# Patient Record
Sex: MALE | Race: WHITE | NOT HISPANIC OR LATINO | Employment: FULL TIME | URBAN - METROPOLITAN AREA
[De-identification: names, ages, dates, MRNs, and addresses within clinical notes are randomized per-mention and may not be internally consistent; named-entity substitution may affect disease eponyms.]

---

## 2024-10-24 ENCOUNTER — OFFICE VISIT (OUTPATIENT)
Dept: PRIMARY CARE | Facility: CLINIC | Age: 49
End: 2024-10-24
Payer: COMMERCIAL

## 2024-10-24 VITALS
HEIGHT: 70 IN | DIASTOLIC BLOOD PRESSURE: 64 MMHG | OXYGEN SATURATION: 98 % | SYSTOLIC BLOOD PRESSURE: 138 MMHG | HEART RATE: 76 BPM | TEMPERATURE: 97.9 F | WEIGHT: 247.6 LBS | BODY MASS INDEX: 35.45 KG/M2 | RESPIRATION RATE: 18 BRPM

## 2024-10-24 DIAGNOSIS — E11.9 TYPE 2 DIABETES MELLITUS WITHOUT COMPLICATION, WITHOUT LONG-TERM CURRENT USE OF INSULIN (MULTI): Primary | ICD-10-CM

## 2024-10-24 DIAGNOSIS — E11.9 TYPE 2 DIABETES MELLITUS WITHOUT COMPLICATION, WITHOUT LONG-TERM CURRENT USE OF INSULIN (MULTI): ICD-10-CM

## 2024-10-24 DIAGNOSIS — I10 ESSENTIAL HYPERTENSION: ICD-10-CM

## 2024-10-24 DIAGNOSIS — Z87.738 HISTORY OF IMPERFORATE ANUS: ICD-10-CM

## 2024-10-24 DIAGNOSIS — E66.812 OBESITY, CLASS II, BMI 35-39.9: ICD-10-CM

## 2024-10-24 DIAGNOSIS — N52.9 ERECTILE DYSFUNCTION, UNSPECIFIED ERECTILE DYSFUNCTION TYPE: ICD-10-CM

## 2024-10-24 DIAGNOSIS — E11.8 CONTROLLED DIABETES MELLITUS TYPE 2 WITH COMPLICATIONS, UNSPECIFIED WHETHER LONG TERM INSULIN USE (MULTI): ICD-10-CM

## 2024-10-24 DIAGNOSIS — R68.82 DIMINISHED LIBIDO: ICD-10-CM

## 2024-10-24 DIAGNOSIS — Z71.89: ICD-10-CM

## 2024-10-24 DIAGNOSIS — Z00.00 HEALTH CARE MAINTENANCE: ICD-10-CM

## 2024-10-24 PROBLEM — J96.01 ACUTE RESPIRATORY FAILURE WITH HYPOXIA (MULTI): Status: RESOLVED | Noted: 2020-07-06 | Resolved: 2024-10-24

## 2024-10-24 PROBLEM — J96.01 ACUTE RESPIRATORY FAILURE WITH HYPOXIA (MULTI): Status: ACTIVE | Noted: 2020-07-06

## 2024-10-24 LAB — POC HEMOGLOBIN A1C: 12.5 % (ref 4.2–6.5)

## 2024-10-24 PROCEDURE — 83036 HEMOGLOBIN GLYCOSYLATED A1C: CPT

## 2024-10-24 PROCEDURE — 3078F DIAST BP <80 MM HG: CPT

## 2024-10-24 PROCEDURE — 3075F SYST BP GE 130 - 139MM HG: CPT

## 2024-10-24 PROCEDURE — 3008F BODY MASS INDEX DOCD: CPT

## 2024-10-24 PROCEDURE — 1036F TOBACCO NON-USER: CPT

## 2024-10-24 PROCEDURE — 99204 OFFICE O/P NEW MOD 45 MIN: CPT

## 2024-10-24 RX ORDER — METFORMIN HYDROCHLORIDE 500 MG/1
TABLET ORAL
Qty: 120 TABLET | Refills: 1 | Status: SHIPPED | OUTPATIENT
Start: 2024-10-24 | End: 2024-10-25

## 2024-10-24 RX ORDER — TIRZEPATIDE 2.5 MG/.5ML
2.5 INJECTION, SOLUTION SUBCUTANEOUS WEEKLY
Qty: 4 PEN | Refills: 0 | Status: SHIPPED | OUTPATIENT
Start: 2024-10-24

## 2024-10-24 NOTE — PATIENT INSTRUCTIONS
Buy BP cuff, check 3 times a week or so, write them down, follow up with me in a month if they are consistently > 120s/80s

## 2024-10-24 NOTE — PROGRESS NOTES
"Subjective   Endy Pruett is a 49 y.o. male   Patient was diagnosed with type 2 diabetes during hospitalization in 2020.  At that time he was hospitalized for COVID-19.  He was on metformin and Amaryl for a time.  He was then on insulin Lantus 25 units nightly.  He has been off of everything for a couple years.  He also has a history of hypertension.  He was originally on lisinopril but he was allergic to that.  He was then on losartan but he has been off of that as well.  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  He reports that his sex drive has significantly dropped recently.  Also his wife is going through menopause.  Patient is unable to get and keep an erection.  He did an online clinic that prescribed him Viagra 25 mg and it did not help.  He is a  and a .    Objective   /64   Pulse 76   Temp 36.6 °C (97.9 °F)   Resp 18   Ht 1.778 m (5' 10\")   Wt 112 kg (247 lb 9.6 oz)   SpO2 98%   BMI 35.53 kg/m²    PHYSICAL EXAM  Gen: Well appearing, in NAD  Eyes: EOMI  HEENT: MMM  Heart: RRR, no murmurs  Lungs: No increased work of breathing, CTAB, on RA  Extremities: WWP, cap refill <2sec, no pitting edema in LE b/l  Neuro: Alert, symmetrical facies, moves all extremities equally  Psych: Appropriate mood and affect    Assessment/Plan     Follow-up in 1 month for annual physical    Problem List Items Addressed This Visit       Type 2 diabetes mellitus without complication, without long-term current use of insulin (Multi) - Primary    Current Assessment & Plan     In office A1c today is extremely elevated at 12.5%.  He has been off of all diabetic medication for years.  Will start him on metformin 500 mg and uptitrate him weekly to a goal of 1000 mg twice daily.  I am also prescribing Mounjaro for type 2 diabetes and obesity.  Also giving a referral to my pharmacy colleagues to " help with diabetic management and GLP-1 medication coverage.  Eventually he will need to get on a high intensity statin.  Will also have to consider putting him back on a low-dose ARB. He is overdue for annual diabetic eye and foot exam.  He is not interested in dietitian or diabetic educator referrals.  He is a  and is pretty familiar with nutrition.  Counseled again on low carbohydrate diet.  Follow-up in 1 month.  Repeat A1c in 3 months.         Relevant Medications    metFORMIN (Glucophage) 500 mg tablet    tirzepatide (Mounjaro) 2.5 mg/0.5 mL pen injector    Other Relevant Orders    Referral to Clinical Pharmacy    Obesity, Class II, BMI 35-39.9    Current Assessment & Plan     Counseled on healthy diet and regular exercise.  Also I am trying to get Mounjaro covered to treat patient's uncontrolled type 2 diabetes and obesity.         Essential hypertension    Current Assessment & Plan     Patient has been off of all antihypertensive medications for a while now.  Blood pressure was only mildly elevated in the office today at 138/64.  Recommended the patient start checking blood pressures at home and follow-up in a month.  If they are consistently greater than 130s over 90s we will start him on low-dose losartan again.  He will likely need to be on low-dose ARB anyway in relation to his type 2 diabetes.  He has an allergy to lisinopril.         Erectile dysfunction    Current Assessment & Plan     Discussed that patient's decreased libido and erectile dysfunction could be multifactorial.  As far as the physiological erectile dysfunction, I counseled patient that uncontrolled diabetes can play a role.  I am not sure if this would also contribute to the diminished libido.  As part of his annual blood work I will also check testosterone levels.  He has failed very low-dose Viagra in the past.  If he wanted to try medication again we could go up in dose or try Cialis or refer to urology.  Discussed with patient  that treating his diabetes should also help.  He does not want to restart any medications for erectile dysfunction today.         Relevant Orders    Testosterone, total and free    History of imperforate anus    Overview     Patient had surgery as a child and he now has a surgically made rectum         Diminished libido     Other Visit Diagnoses       Advised to contact pharmacist        Health care maintenance        Relevant Orders    CBC    Comprehensive Metabolic Panel    Lipid Panel    TSH with reflex to Free T4 if abnormal    Vitamin D 25-Hydroxy,Total (for eval of Vitamin D levels)    Vitamin B12    Prostate Specific Antigen          Mile Hurst D.O.  Family Medicine Physician  [facility]  [address] [place] [address] [phone]    This note has been transcribed using Dragon voice recognition system and there is a possibility of unintentional typing misprints.

## 2024-10-24 NOTE — ASSESSMENT & PLAN NOTE
Counseled on healthy diet and regular exercise.  Also I am trying to get Mounjaro covered to treat patient's uncontrolled type 2 diabetes and obesity.

## 2024-10-24 NOTE — ASSESSMENT & PLAN NOTE
In office A1c today is extremely elevated at 12.5%.  He has been off of all diabetic medication for years.  Will start him on metformin 500 mg and uptitrate him weekly to a goal of 1000 mg twice daily.  I am also prescribing Mounjaro for type 2 diabetes and obesity.  Also giving a referral to my pharmacy colleagues to help with diabetic management and GLP-1 medication coverage.  Eventually he will need to get on a high intensity statin.  Will also have to consider putting him back on a low-dose ARB. He is overdue for annual diabetic eye and foot exam.  He is not interested in dietitian or diabetic educator referrals.  He is a  and is pretty familiar with nutrition.  Counseled again on low carbohydrate diet.  Follow-up in 1 month.  Repeat A1c in 3 months.

## 2024-10-24 NOTE — ASSESSMENT & PLAN NOTE
Discussed that patient's decreased libido and erectile dysfunction could be multifactorial.  As far as the physiological erectile dysfunction, I counseled patient that uncontrolled diabetes can play a role.  I am not sure if this would also contribute to the diminished libido.  As part of his annual blood work I will also check testosterone levels.  He has failed very low-dose Viagra in the past.  If he wanted to try medication again we could go up in dose or try Cialis or refer to urology.  Discussed with patient that treating his diabetes should also help.  He does not want to restart any medications for erectile dysfunction today.

## 2024-10-24 NOTE — ASSESSMENT & PLAN NOTE
Patient has been off of all antihypertensive medications for a while now.  Blood pressure was only mildly elevated in the office today at 138/64.  Recommended the patient start checking blood pressures at home and follow-up in a month.  If they are consistently greater than 130s over 90s we will start him on low-dose losartan again.  He will likely need to be on low-dose ARB anyway in relation to his type 2 diabetes.  He has an allergy to lisinopril.

## 2024-10-25 RX ORDER — METFORMIN HYDROCHLORIDE 500 MG/1
TABLET ORAL
Qty: 360 TABLET | Refills: 3 | Status: SHIPPED | OUTPATIENT
Start: 2024-10-25

## 2024-10-28 ENCOUNTER — TELEPHONE (OUTPATIENT)
Dept: PRIMARY CARE | Facility: CLINIC | Age: 49
End: 2024-10-28
Payer: COMMERCIAL

## 2024-11-12 ENCOUNTER — LAB (OUTPATIENT)
Dept: LAB | Facility: LAB | Age: 49
End: 2024-11-12
Payer: COMMERCIAL

## 2024-11-12 DIAGNOSIS — N52.9 ERECTILE DYSFUNCTION, UNSPECIFIED ERECTILE DYSFUNCTION TYPE: ICD-10-CM

## 2024-11-12 DIAGNOSIS — Z00.00 HEALTH CARE MAINTENANCE: ICD-10-CM

## 2024-11-12 LAB
25(OH)D3 SERPL-MCNC: 20 NG/ML (ref 30–100)
ALBUMIN SERPL BCP-MCNC: 4.1 G/DL (ref 3.4–5)
ALP SERPL-CCNC: 48 U/L (ref 33–120)
ALT SERPL W P-5'-P-CCNC: 20 U/L (ref 10–52)
ANION GAP SERPL CALC-SCNC: 8 MMOL/L (ref 10–20)
AST SERPL W P-5'-P-CCNC: 20 U/L (ref 9–39)
BILIRUB SERPL-MCNC: 1.3 MG/DL (ref 0–1.2)
BUN SERPL-MCNC: 9 MG/DL (ref 6–23)
CALCIUM SERPL-MCNC: 8.6 MG/DL (ref 8.6–10.3)
CHLORIDE SERPL-SCNC: 103 MMOL/L (ref 98–107)
CHOLEST SERPL-MCNC: 117 MG/DL (ref 0–199)
CHOLESTEROL/HDL RATIO: 2.4
CO2 SERPL-SCNC: 32 MMOL/L (ref 21–32)
CREAT SERPL-MCNC: 0.82 MG/DL (ref 0.5–1.3)
EGFRCR SERPLBLD CKD-EPI 2021: >90 ML/MIN/1.73M*2
ERYTHROCYTE [DISTWIDTH] IN BLOOD BY AUTOMATED COUNT: 12.6 % (ref 11.5–14.5)
GLUCOSE SERPL-MCNC: 177 MG/DL (ref 74–99)
HCT VFR BLD AUTO: 46.2 % (ref 41–52)
HDLC SERPL-MCNC: 49.4 MG/DL
HGB BLD-MCNC: 15.5 G/DL (ref 13.5–17.5)
LDLC SERPL CALC-MCNC: 50 MG/DL
MCH RBC QN AUTO: 30.3 PG (ref 26–34)
MCHC RBC AUTO-ENTMCNC: 33.5 G/DL (ref 32–36)
MCV RBC AUTO: 90 FL (ref 80–100)
NON HDL CHOLESTEROL: 68 MG/DL (ref 0–149)
NRBC BLD-RTO: 0 /100 WBCS (ref 0–0)
PLATELET # BLD AUTO: 170 X10*3/UL (ref 150–450)
POTASSIUM SERPL-SCNC: 4.4 MMOL/L (ref 3.5–5.3)
PROT SERPL-MCNC: 6.5 G/DL (ref 6.4–8.2)
PSA SERPL-MCNC: 0.3 NG/ML
RBC # BLD AUTO: 5.12 X10*6/UL (ref 4.5–5.9)
SODIUM SERPL-SCNC: 139 MMOL/L (ref 136–145)
TRIGL SERPL-MCNC: 87 MG/DL (ref 0–149)
TSH SERPL-ACNC: 1.01 MIU/L (ref 0.44–3.98)
VIT B12 SERPL-MCNC: 364 PG/ML (ref 211–911)
VLDL: 17 MG/DL (ref 0–40)
WBC # BLD AUTO: 5 X10*3/UL (ref 4.4–11.3)

## 2024-11-12 PROCEDURE — 82306 VITAMIN D 25 HYDROXY: CPT

## 2024-11-12 PROCEDURE — 85027 COMPLETE CBC AUTOMATED: CPT

## 2024-11-12 PROCEDURE — 84402 ASSAY OF FREE TESTOSTERONE: CPT

## 2024-11-12 PROCEDURE — 80061 LIPID PANEL: CPT

## 2024-11-12 PROCEDURE — 84153 ASSAY OF PSA TOTAL: CPT

## 2024-11-12 PROCEDURE — 36415 COLL VENOUS BLD VENIPUNCTURE: CPT

## 2024-11-12 PROCEDURE — 80053 COMPREHEN METABOLIC PANEL: CPT

## 2024-11-12 PROCEDURE — 82607 VITAMIN B-12: CPT

## 2024-11-12 PROCEDURE — 84443 ASSAY THYROID STIM HORMONE: CPT

## 2024-11-16 LAB
TESTOSTERONE FREE (CHAN): 70.6 PG/ML (ref 35–155)
TESTOSTERONE,TOTAL,LC-MS/MS: 494 NG/DL (ref 250–1100)

## 2024-11-25 ENCOUNTER — APPOINTMENT (OUTPATIENT)
Dept: PRIMARY CARE | Facility: CLINIC | Age: 49
End: 2024-11-25
Payer: COMMERCIAL

## 2024-11-25 VITALS
TEMPERATURE: 97.2 F | WEIGHT: 247.4 LBS | BODY MASS INDEX: 35.42 KG/M2 | HEART RATE: 80 BPM | RESPIRATION RATE: 18 BRPM | DIASTOLIC BLOOD PRESSURE: 60 MMHG | OXYGEN SATURATION: 96 % | HEIGHT: 70 IN | SYSTOLIC BLOOD PRESSURE: 128 MMHG

## 2024-11-25 DIAGNOSIS — E80.6 HYPERBILIRUBINEMIA: ICD-10-CM

## 2024-11-25 DIAGNOSIS — Z87.738 HISTORY OF IMPERFORATE ANUS: ICD-10-CM

## 2024-11-25 DIAGNOSIS — Z12.11 ENCOUNTER FOR SCREENING FOR MALIGNANT NEOPLASM OF COLON: ICD-10-CM

## 2024-11-25 DIAGNOSIS — Z00.00 HEALTH CARE MAINTENANCE: ICD-10-CM

## 2024-11-25 DIAGNOSIS — E55.9 VITAMIN D DEFICIENCY: ICD-10-CM

## 2024-11-25 DIAGNOSIS — E66.812 OBESITY, CLASS II, BMI 35-39.9: ICD-10-CM

## 2024-11-25 DIAGNOSIS — Z23 ENCOUNTER FOR IMMUNIZATION: ICD-10-CM

## 2024-11-25 DIAGNOSIS — Z00.00 ANNUAL PHYSICAL EXAM: Primary | ICD-10-CM

## 2024-11-25 DIAGNOSIS — N52.1 ERECTILE DYSFUNCTION DUE TO DISEASES CLASSIFIED ELSEWHERE: ICD-10-CM

## 2024-11-25 DIAGNOSIS — H91.93 BILATERAL HEARING LOSS, UNSPECIFIED HEARING LOSS TYPE: ICD-10-CM

## 2024-11-25 DIAGNOSIS — E11.9 TYPE 2 DIABETES MELLITUS WITHOUT COMPLICATION, WITHOUT LONG-TERM CURRENT USE OF INSULIN (MULTI): ICD-10-CM

## 2024-11-25 PROBLEM — I10 ESSENTIAL HYPERTENSION: Status: RESOLVED | Noted: 2017-10-26 | Resolved: 2024-11-25

## 2024-11-25 PROCEDURE — 3048F LDL-C <100 MG/DL: CPT

## 2024-11-25 PROCEDURE — 1036F TOBACCO NON-USER: CPT

## 2024-11-25 PROCEDURE — 3078F DIAST BP <80 MM HG: CPT

## 2024-11-25 PROCEDURE — 99396 PREV VISIT EST AGE 40-64: CPT

## 2024-11-25 PROCEDURE — 3074F SYST BP LT 130 MM HG: CPT

## 2024-11-25 PROCEDURE — 90471 IMMUNIZATION ADMIN: CPT

## 2024-11-25 PROCEDURE — 3008F BODY MASS INDEX DOCD: CPT

## 2024-11-25 PROCEDURE — 90656 IIV3 VACC NO PRSV 0.5 ML IM: CPT

## 2024-11-25 RX ORDER — LANCETS
1 EACH MISCELLANEOUS DAILY
Qty: 100 EACH | Refills: 3 | Status: SHIPPED | OUTPATIENT
Start: 2024-11-25

## 2024-11-25 RX ORDER — ROSUVASTATIN CALCIUM 20 MG/1
20 TABLET, COATED ORAL DAILY
Qty: 90 TABLET | Refills: 3 | Status: SHIPPED | OUTPATIENT
Start: 2024-11-25 | End: 2025-11-20

## 2024-11-25 RX ORDER — BISMUTH SUBSALICYLATE 262 MG
1 TABLET,CHEWABLE ORAL DAILY
COMMUNITY

## 2024-11-25 RX ORDER — CHOLECALCIFEROL (VITAMIN D3) 50 MCG
50 TABLET ORAL DAILY
COMMUNITY

## 2024-11-25 RX ORDER — TIRZEPATIDE 5 MG/.5ML
5 INJECTION, SOLUTION SUBCUTANEOUS WEEKLY
Qty: 2 ML | Refills: 2 | Status: SHIPPED | OUTPATIENT
Start: 2024-11-25 | End: 2024-11-27 | Stop reason: SDUPTHER

## 2024-11-25 RX ORDER — TIRZEPATIDE 2.5 MG/.5ML
2.5 INJECTION, SOLUTION SUBCUTANEOUS WEEKLY
Qty: 4 PEN | Refills: 0 | Status: CANCELLED | OUTPATIENT
Start: 2024-11-25

## 2024-11-25 RX ORDER — INSULIN PUMP SYRINGE, 3 ML
1 EACH MISCELLANEOUS 3 TIMES DAILY PRN
Qty: 1 KIT | Refills: 0 | Status: SHIPPED | OUTPATIENT
Start: 2024-11-25 | End: 2024-11-26

## 2024-11-25 NOTE — ASSESSMENT & PLAN NOTE
Patient is currently drinking too much alcohol so I counseled him on decreasing his use.  In a couple of months we will recheck his bilirubin level, as well as his A1c, and his vitamin D level.

## 2024-11-25 NOTE — PATIENT INSTRUCTIONS
Increase to mounjaro 5mg after finishing the 2.5mg injections  Schedule once monthly phone calls with clinical pharmacist after being on mounjaro 5mg for about 1 month  We will repeat an A1c and liver tests and vit D at the end of January (I ordered these) - do not need to be fasting   Come back and see me at the end of February  Decrease alcohol use  Start crestor (rosuvastatin)  Get colonoscopy done  See a dentist  See audiologist for hearing test  Check blood sugars and blood pressures occasionally at home and write them down

## 2024-11-25 NOTE — ASSESSMENT & PLAN NOTE
A1c from 10/24/2024 was extremely elevated at 12.5%.  That was when he was off of all medications for years.  Continue metformin 1000 mg twice daily.  Will increase Mounjaro from 2.5 to 5 mg once weekly.  Recommended he begin his clinical pharmacy monthly phone calls once he has been on the Mounjaro 5 mg for 1 month.  Will start patient on rosuvastatin 20 mg daily as his high intensity statin.  He is still currently not on an ACE inhibitor or ARB.  He has an intolerance to lisinopril.  His blood pressures are great in the office today at 128/60 on no anti-hypertensives so we will hold off on starting an ARB yet today.  May reconsider in the future.  Updated annual diabetic foot exam in the office today.  It was normal.  Patient is up-to-date on annual diabetic eye exam.  He has not been checking his blood sugars at home recently.  I am sending him in more diabetic testing supplies.  He is up-to-date on vaccinations.  Will repeat an A1c in a couple of months when it has been 3 months since his last.  Follow-up in 3 months.

## 2024-11-25 NOTE — PROGRESS NOTES
Subjective   Endy Pruett is a 49 y.o. male     ANNUAL PHYSICAL EXAM    Concerns:  - At his last visit patient was not on any medication for his diabetes and it was extremely uncontrolled so I started him on metformin 500 mg once daily and he has uptitrated it slowly, and is now on metformin 1000 mg twice daily.  He also has been on Mounjaro 2.5 mg once weekly.  He states that the only side effects he has had from either of these was a little bit of diarrhea with the metformin but usually he has no issues.  He is not yet on a statin medication.  He used to be on lisinopril for hypertension however he is allergic to that.  He has been checking blood pressures at home and a couple of them have been elevated however in the office today it is normal at 128/60.  He is up-to-date on annual eye exam.  He needs an updated diabetic foot exam in the office today.  He has not yet started doing once monthly phone calls with our clinical pharmacist for GLP-1 management.    - Recent blood work showed low vitamin D so he has been taking over-the-counter vitamin D 2,000 international units daily now in addition to his regular multivitamin.    - On his recent blood work his bilirubin was also slightly elevated at 1.3.  He states that this could be due to alcohol use.  He drinks on average 3 12-ounce beers, maybe 5 days/week.  He also makes his own red wine at home.    Specialists that pt follows with: None    Preventative:  - Immunizations: UTD on covid x3, Tdap (2017), pneumonia. Needs flu shot  - Colorectal cancer screening: Never had, needs  - Chlamydia/Gonorrhea testing: Not interested   - 1 time Hep C testing: done  - 1 time HIV testing: done  - Dental care: Needs  - Vision care: UTD    Lifestyle:  - Occupation: He is a  at [employer] in [place], and is also a  (not actively practicing at the moment)  - Diet: Well balanced. Reports he doesn't eat much during the day during work   - Exercise: Infrequently  "  - Alcohol/tobacco/drugs: Drinks about 3 12-oz beers about 5 days a week. Also makes his own red wine at home. No tobacco/nicotine/drugs  - Mood: Good    Active Problem List      Comprehensive Medical/Surgical/Social/Family History  History reviewed. No pertinent past medical history.  Past Surgical History:   Procedure Laterality Date    WISDOM TOOTH EXTRACTION       Social History     Social History Narrative    Not on file       Allergies and Medications  Penicillins, Caffeine, and Lisinopril  Current Outpatient Medications on File Prior to Visit   Medication Sig Dispense Refill    metFORMIN (Glucophage) 500 mg tablet USE AS DIRECTED 360 tablet 3    [DISCONTINUED] tirzepatide (Mounjaro) 2.5 mg/0.5 mL pen injector Inject 2.5 mg under the skin 1 (one) time per week. 4 Pen 0    cholecalciferol (Vitamin D3) 50 MCG (2000 UT) tablet Take 1 tablet (50 mcg) by mouth once daily.      multivitamin tablet Take 1 tablet by mouth once daily.       No current facility-administered medications on file prior to visit.       Objective   /60   Pulse 80   Temp 36.2 °C (97.2 °F)   Resp 18   Ht 1.778 m (5' 10\")   Wt 112 kg (247 lb 6.4 oz)   SpO2 96%   BMI 35.50 kg/m²    PHYSICAL EXAM  Gen: Well appearing, in NAD  Eyes: EOMI  HEENT: MMM. TMs normal. Throat normal.  Heart: RRR, no murmurs  Lungs: No increased work of breathing, CTAB, on RA  GI: Soft, NTND, no guarding or rebound  Extremities: WWP, cap refill <2sec, no pitting edema in LE b/l  Right foot:      Protective Sensation: 5 sites tested. 5 sites sensed.      Skin integrity: Skin integrity normal.      Toenail Condition: Right toenails are normal.   Left foot:      Protective Sensation: 5 sites tested. 5 sites sensed.      Skin integrity: Skin integrity normal.      Toenail Condition: Left toenails are normal.   Neuro: Alert, symmetrical facies, moves all extremities equally  Skin: No rashes or lesions  Psych: Appropriate mood and affect    Assessment/Plan   - " Reviewed Social Determinants of health with patient. Discussed healthy lifestyle, including 150 minutes of physical activity per week.  - Ordered/Reviewed baseline labwork   - Immunizations up to date  - Follow up in 1 year for next annual physical or sooner for acute concerns    Problem List Items Addressed This Visit       Type 2 diabetes mellitus without complication, without long-term current use of insulin (Multi)    Current Assessment & Plan     A1c from 10/24/2024 was extremely elevated at 12.5%.  That was when he was off of all medications for years.  Continue metformin 1000 mg twice daily.  Will increase Mounjaro from 2.5 to 5 mg once weekly.  Recommended he begin his clinical pharmacy monthly phone calls once he has been on the Mounjaro 5 mg for 1 month.  Will start patient on rosuvastatin 20 mg daily as his high intensity statin.  He is still currently not on an ACE inhibitor or ARB.  He has an intolerance to lisinopril.  His blood pressures are great in the office today at 128/60 on no anti-hypertensives so we will hold off on starting an ARB yet today.  May reconsider in the future.  Updated annual diabetic foot exam in the office today.  It was normal.  Patient is up-to-date on annual diabetic eye exam.  He has not been checking his blood sugars at home recently.  I am sending him in more diabetic testing supplies.  He is up-to-date on vaccinations.  Will repeat an A1c in a couple of months when it has been 3 months since his last.  Follow-up in 3 months.         Relevant Medications    tirzepatide (Mounjaro) 5 mg/0.5 mL pen injector    rosuvastatin (Crestor) 20 mg tablet    blood sugar diagnostic strip    lancets misc    Blood glucose monitoring meter    Obesity, Class II, BMI 35-39.9    Current Assessment & Plan     Patient should hopefully lose some weight on the Mounjaro.  Counseled him again on healthy diet, focusing on low carbohydrates.         Erectile dysfunction    Current Assessment & Plan      Suspect this will improve with improving his blood sugars.         History of imperforate anus    Overview     Patient had surgery as a child and he now has a surgically made rectum         Bilateral hearing loss    Current Assessment & Plan     Patient would like to see an audiologist so referral was provided         Relevant Orders    Referral to Audiology    Hyperbilirubinemia    Current Assessment & Plan     Patient is currently drinking too much alcohol so I counseled him on decreasing his use.  In a couple of months we will recheck his bilirubin level, as well as his A1c, and his vitamin D level.         Relevant Orders    Hepatic function panel    Vitamin D deficiency    Overview     Continue multivitamin plus vitamin D 2000 international units daily          Other Visit Diagnoses       Annual physical exam    -  Primary    Encounter for immunization        Relevant Orders    Flu vaccine, trivalent, preservative free, age 6 months and greater (Fluarix/Fluzone/Flulaval) (Completed)    Encounter for screening for malignant neoplasm of colon        Relevant Orders    Colonoscopy Screening; Average Risk Patient    Health care maintenance        Relevant Orders    Hemoglobin A1C    Vitamin D 25-Hydroxy,Total (for eval of Vitamin D levels)          YOANA LopezO.  Family Medicine Physician  [facility]  [address] [place] [address] [phone]    This note has been transcribed using Dragon voice recognition system and there is a possibility of unintentional typing misprints.

## 2024-11-25 NOTE — ASSESSMENT & PLAN NOTE
Patient should hopefully lose some weight on the Mounjaro.  Counseled him again on healthy diet, focusing on low carbohydrates.

## 2024-11-27 ENCOUNTER — TELEMEDICINE (OUTPATIENT)
Dept: PHARMACY | Facility: HOSPITAL | Age: 49
End: 2024-11-27
Payer: COMMERCIAL

## 2024-11-27 DIAGNOSIS — E11.9 TYPE 2 DIABETES MELLITUS WITHOUT COMPLICATION, WITHOUT LONG-TERM CURRENT USE OF INSULIN (MULTI): ICD-10-CM

## 2024-11-27 RX ORDER — TIRZEPATIDE 5 MG/.5ML
5 INJECTION, SOLUTION SUBCUTANEOUS WEEKLY
Qty: 2 ML | Refills: 1 | Status: SHIPPED | OUTPATIENT
Start: 2024-11-27

## 2024-11-27 NOTE — ASSESSMENT & PLAN NOTE
Is pt at goal? No, 12.5% on 10.2024  Patient's SMBGs are above goal.     Rationale for plan: Patient recently started on Mounjaro. Has been tolerating well and will plan to titrate for BG control. Will screen for [facility] PAP.    Medication Changes:  INCREASE  Mounjaro to 5mg once weekly when 2.5mg supply is exhausted      PATIENT EDUCATION/GOALS  Average < 150mg/dL  Time in range > 70%  Fasting B - 130 mg/dL  Postprandial BG: less than 180 mg/dL  A1c: less than 7%    Low and High Blood Sugar  Symptoms of low blood sugar include: Fast heartbeat, shaking, sweating, nervousness or anxiety, irritability or confusion, and/or dizziness.  Symptoms of high blood sugar include: Feeling more thirsty than usual, urinating often, losing weight without trying, presence of ketones in the urine, feeling tired and weak, feeling irritable or having other mood changes, having blurry vision, and/or having slow-healing sores.  If you experience symptoms of low blood sugar (blood sugar less than 70 mg/dL) follow the rule of 15 by eating ~15 g of simple carbohydrates (examples: half cup juice, 3-4 glucose tabs, 1 tablespoon of sugar, honey, or syrup).    Dietary Recommendations  The a lower carbohydrate or Mediterranean diet is often recommended for patients with elevated blood glucose.   Food recommendations:   Focus on whole foods, with as few ingredients as possible.   Focus on lower glycemic foods (GI of 55 or less): this includes most fruits and vegetables, beans, minimally processed grains, dairy, nuts and seeds.  Minimize moderate glycemic index (GI 56 to 69) foods: White and sweet potatoes, corn, white rice, couscous, breakfast cereals such as Cream of Wheat.  Avoid high glycemic index (GI of 70 or higher): White bread, rice cakes, most crackers, bagels, cakes, doughnuts, croissants, most packaged breakfast cereals.  Include 1-2 servings weekly fatty fish that are low in mercury such as salmon, mackerel, anchovies, sardines, and  herring. Avoid frying fish. Bake, steam, or poach.   Avoid trans-fats (fried foods, microwave popcorn, margarine, etc.).   Use oils such as coconut oil, olive oil, avocado oil, or ghee. Select oils appropriate for the temperature you are cooking at.   Avoid processed meats (such as deli meat), canned soups, soy sauce, and fried foods these are all high in added sodium.   The recommended sodium intake for most people is around 2,300 mg/day (too little or too much salt can affect blood pressure).   It is ok to add salt to suit your taste to fresh whole foods (such as vegetables) that you are cooking at home.   Include 4-5 servings daily of both fruits and vegetables. Fruits and vegetables are a good source of fiber, potassium, and magnesium which help support healthy blood pressure.  Focus on eating a variety of colors each day (eating the rainbow- such as red peppers, orange carrots, yellow beans, green lettuce, blue/purple berries, white/brown onions).   Avoid foods with added sugars (goal of <10 grams/serving of added sugar). Limit added sugars to less than 24 (women)-36 (men) grams daily.  Beverage recommendations:    Avoid caffeinated drinks such as coffee, energy drinks, and soda (both regular and diet). Consider sparkling water, water with lemon (or other fruit), or black, oolong, or green tea (prior to noon).   Avoid regular consumption of alcohol. If it is a special occasion the recommended alcohol intake for a male is 2 (men) or 1(women) or less drinks per day.  Alcohol consumption may place people with diabetes at increased risk for delayed hypoglycemia (low blood sugar) especially if taking other medications that may cause hypoglycemia such as insulin.     Lifestyle Recommendations  Avoid tobacco products (including chewing tobacco and vaping).   Continue to integrate regular movement and enjoyable forms of exercise into your weekly routine. The recommended exercise regimen is 150 minutes per week (for  example 5 days per week, 30 minutes per day).   Consider walking for 10-15 minutes after each meal in order to help control blood sugar.   Manage/reduce stress  Consider therapy, mindfulness, breathing exercises (4-7-8 breath), meditation, yoga, journaling, addressing/removing stressors, etc.   Sleep  Goal of 7-9 hours of restful sleep nightly.      Mounjaro Education:  Counseled patient on Mounjaro MOA, expectations, side effects, duration of therapy, administration, and monitoring parameters.  Counseled patient on the benefits of GLP-1ra, such as cardiovascular risk reduction, glycemic control, and weight loss potential.  Provided detailed dosing and administration counseling to ensure proper technique.   Reviewed Mounjaro titration schedule, starting with 2.5 mg once weekly to 5 mg, 7.5mg, 10mg, 12.5mg and if tolerated 15 mg.  Reviewed storage requirements of Mounjaro when not in use, and when to administer the medication if a dose is missed.  Discussed risks of GLP1ra including risk of pancreatitis, MTC and worsening of DR  Advised patient that they may experience improved satiety after meals and portion sizes of meals may be reduced as doses of Mounjaro increase.

## 2024-11-27 NOTE — PROGRESS NOTES
Patient ID: Endy Pruett is a 49 y.o. male who presents for Diabetes.  Pt is here for First appointment.     PCP/Referring Provider: Mile Hurst DO  Last Visit: 11.25.24  Next visit: 2.24.25    Verbal consent to manage patient's drug therapy was obtained from patient. They were informed they may decline to participate or withdraw from participation in pharmacy services at any time.      Subjective   Treatment Adherence:   Preferred pharmacy: [facility]  Can patient afford prescribed medications: No, Mounjaro $$$       HPI  DIABETES MELLITUS TYPE 2:    Known diabetic complications: none.  Does patient follow with Endocrinology?: no     Last optometry exam: not assessed  Most recent visit in Podiatry: not assessed     Current diabetic medications include:  Mounjaro 2.5mg once weekly  Metformin 500mg daily    Clarifications to above regimen: none  Adverse Effects: none reported     Home blood glucose records (mg/dL)  FBG: not assessed    Any episodes of hypoglycemia? No, denies .    Did patient treat episode of hypoglycemia appropriately? N/A  Does the patient have a prescription for ready-to-use Glucagon? Not on insulin     Does pt have proteinuria? N/A   If appropriate, is patient on ACEi/ARB? N/A    Secondary Prevention  Statin? Yes  ACE-I/ARB? No  Aspirin? N/A    Pertinent PMH Review:  PMH of Pancreatitis: No  PMH of Retinopathy: No  PMH of Urinary Tract Infections: No  PMH of MTC: No    Lifestyle:  Current exercise: not assessed  Current diet: not assessed        Review of Systems    Objective     BP Readings from Last 4 Encounters:   11/25/24 128/60   10/24/24 138/64      There were no vitals filed for this visit.     Labs  Creatinine   Date Value Ref Range Status   11/12/2024 0.82 0.50 - 1.30 mg/dL Final     eGFR   Date Value Ref Range Status   11/12/2024 >90 >60 mL/min/1.73m*2 Final     Comment:     Calculations of estimated GFR are performed using the 2021 CKD-EPI Study Refit equation without  the race variable for the IDMS-Traceable creatinine methods.  https://jasn.asnjournals.org/content/early/2021/09/22/ASN.0291595219     Sodium   Date Value Ref Range Status   11/12/2024 139 136 - 145 mmol/L Final     Potassium   Date Value Ref Range Status   11/12/2024 4.4 3.5 - 5.3 mmol/L Final     Chloride   Date Value Ref Range Status   11/12/2024 103 98 - 107 mmol/L Final     Urea Nitrogen   Date Value Ref Range Status   11/12/2024 9 6 - 23 mg/dL Final     AST   Date Value Ref Range Status   11/12/2024 20 9 - 39 U/L Final        Lab Results   Component Value Date    BILITOT 1.3 (H) 11/12/2024    CALCIUM 8.6 11/12/2024    CO2 32 11/12/2024     11/12/2024    CREATININE 0.82 11/12/2024    GLUCOSE 177 (H) 11/12/2024    ALKPHOS 48 11/12/2024    K 4.4 11/12/2024    PROT 6.5 11/12/2024     11/12/2024    AST 20 11/12/2024    ALT 20 11/12/2024    BUN 9 11/12/2024    ANIONGAP 8 (L) 11/12/2024    ALBUMIN 4.1 11/12/2024     Lab Results   Component Value Date    TRIG 87 11/12/2024    CHOL 117 11/12/2024    LDLCALC 50 11/12/2024    HDL 49.4 11/12/2024     Lab Results   Component Value Date    HGBA1C 12.5 (A) 10/24/2024       Current Outpatient Medications   Medication Instructions    blood sugar diagnostic strip 1 each, miscellaneous, Daily    cholecalciferol (VITAMIN D3) 50 mcg, oral, Daily    lancets misc 1 each, miscellaneous, Daily    metFORMIN (Glucophage) 500 mg tablet USE AS DIRECTED    Mounjaro 5 mg, subcutaneous, Weekly    multivitamin tablet 1 tablet, oral, Daily    rosuvastatin (CRESTOR) 20 mg, oral, Daily        DRUG INTERACTIONS:  None at time of review    Assessment/Plan   Problem List Items Addressed This Visit             ICD-10-CM    Type 2 diabetes mellitus without complication, without long-term current use of insulin (Multi) E11.9     Is pt at goal? No, 12.5% on 10.2024  Patient's SMBGs are above goal.     Rationale for plan: Patient recently started on Mounjaro. Has been tolerating well and  will plan to titrate for BG control. Will screen for [facility] PAP.    Medication Changes:  INCREASE  Mounjaro to 5mg once weekly when 2.5mg supply is exhausted      PATIENT EDUCATION/GOALS  Average < 150mg/dL  Time in range > 70%  Fasting B - 130 mg/dL  Postprandial BG: less than 180 mg/dL  A1c: less than 7%    Low and High Blood Sugar  Symptoms of low blood sugar include: Fast heartbeat, shaking, sweating, nervousness or anxiety, irritability or confusion, and/or dizziness.  Symptoms of high blood sugar include: Feeling more thirsty than usual, urinating often, losing weight without trying, presence of ketones in the urine, feeling tired and weak, feeling irritable or having other mood changes, having blurry vision, and/or having slow-healing sores.  If you experience symptoms of low blood sugar (blood sugar less than 70 mg/dL) follow the rule of 15 by eating ~15 g of simple carbohydrates (examples: half cup juice, 3-4 glucose tabs, 1 tablespoon of sugar, honey, or syrup).    Dietary Recommendations  The a lower carbohydrate or Mediterranean diet is often recommended for patients with elevated blood glucose.   Food recommendations:   Focus on whole foods, with as few ingredients as possible.   Focus on lower glycemic foods (GI of 55 or less): this includes most fruits and vegetables, beans, minimally processed grains, dairy, nuts and seeds.  Minimize moderate glycemic index (GI 56 to 69) foods: White and sweet potatoes, corn, white rice, couscous, breakfast cereals such as Cream of Wheat.  Avoid high glycemic index (GI of 70 or higher): White bread, rice cakes, most crackers, bagels, cakes, doughnuts, croissants, most packaged breakfast cereals.  Include 1-2 servings weekly fatty fish that are low in mercury such as salmon, mackerel, anchovies, sardines, and herring. Avoid frying fish. Bake, steam, or poach.   Avoid trans-fats (fried foods, microwave popcorn, margarine, etc.).   Use oils such as coconut oil, olive  oil, avocado oil, or ghee. Select oils appropriate for the temperature you are cooking at.   Avoid processed meats (such as deli meat), canned soups, soy sauce, and fried foods these are all high in added sodium.   The recommended sodium intake for most people is around 2,300 mg/day (too little or too much salt can affect blood pressure).   It is ok to add salt to suit your taste to fresh whole foods (such as vegetables) that you are cooking at home.   Include 4-5 servings daily of both fruits and vegetables. Fruits and vegetables are a good source of fiber, potassium, and magnesium which help support healthy blood pressure.  Focus on eating a variety of colors each day (eating the rainbow- such as red peppers, orange carrots, yellow beans, green lettuce, blue/purple berries, white/brown onions).   Avoid foods with added sugars (goal of <10 grams/serving of added sugar). Limit added sugars to less than 24 (women)-36 (men) grams daily.  Beverage recommendations:    Avoid caffeinated drinks such as coffee, energy drinks, and soda (both regular and diet). Consider sparkling water, water with lemon (or other fruit), or black, oolong, or green tea (prior to noon).   Avoid regular consumption of alcohol. If it is a special occasion the recommended alcohol intake for a male is 2 (men) or 1(women) or less drinks per day.  Alcohol consumption may place people with diabetes at increased risk for delayed hypoglycemia (low blood sugar) especially if taking other medications that may cause hypoglycemia such as insulin.     Lifestyle Recommendations  Avoid tobacco products (including chewing tobacco and vaping).   Continue to integrate regular movement and enjoyable forms of exercise into your weekly routine. The recommended exercise regimen is 150 minutes per week (for example 5 days per week, 30 minutes per day).   Consider walking for 10-15 minutes after each meal in order to help control blood sugar.   Manage/reduce  stress  Consider therapy, mindfulness, breathing exercises (4-7-8 breath), meditation, yoga, journaling, addressing/removing stressors, etc.   Sleep  Goal of 7-9 hours of restful sleep nightly.      Mounjaro Education:  Counseled patient on Mounjaro MOA, expectations, side effects, duration of therapy, administration, and monitoring parameters.  Counseled patient on the benefits of GLP-1ra, such as cardiovascular risk reduction, glycemic control, and weight loss potential.  Provided detailed dosing and administration counseling to ensure proper technique.   Reviewed Mounjaro titration schedule, starting with 2.5 mg once weekly to 5 mg, 7.5mg, 10mg, 12.5mg and if tolerated 15 mg.  Reviewed storage requirements of Mounjaro when not in use, and when to administer the medication if a dose is missed.  Discussed risks of GLP1ra including risk of pancreatitis, MTC and worsening of DR  Advised patient that they may experience improved satiety after meals and portion sizes of meals may be reduced as doses of Mounjaro increase.            Immunizations needed:  COVID    Labs ordered:  none     Referrals:  none     Follow-up: January     Patient was provided with PharmD phone number and encouraged to reach out with any questions or concerns Prior to next appointment or ask provider for another pharmacy referral.    Time spent with pt: Total length of time 20 (minutes) of the encounter and more than 50% was spent counseling the patient.    Thank you for allowing to take part in the care of this patient.    Diane Escalona, PharmD, BARRIE  Clinical Pharmacist  [phone]  [email or web address]    Continue all meds under the continuation of care with the referring provider and clinical pharmacy team.    Verbal consent to manage patient's drug therapy was obtained from the patient. They were informed they may decline to participate or withdraw from participation in pharmacy services at any time.

## 2024-12-03 PROCEDURE — RXMED WILLOW AMBULATORY MEDICATION CHARGE

## 2024-12-05 ENCOUNTER — PHARMACY VISIT (OUTPATIENT)
Dept: PHARMACY | Facility: CLINIC | Age: 49
End: 2024-12-05
Payer: MEDICARE

## 2024-12-26 DIAGNOSIS — E55.9 VITAMIN D DEFICIENCY: Primary | ICD-10-CM

## 2024-12-26 DIAGNOSIS — E11.9 TYPE 2 DIABETES MELLITUS WITHOUT COMPLICATION, WITHOUT LONG-TERM CURRENT USE OF INSULIN (MULTI): Primary | ICD-10-CM

## 2024-12-26 DIAGNOSIS — E11.9 TYPE 2 DIABETES MELLITUS WITHOUT COMPLICATION, WITHOUT LONG-TERM CURRENT USE OF INSULIN (MULTI): ICD-10-CM

## 2024-12-26 PROCEDURE — RXMED WILLOW AMBULATORY MEDICATION CHARGE

## 2024-12-26 RX ORDER — ROSUVASTATIN CALCIUM 20 MG/1
20 TABLET, COATED ORAL DAILY
Qty: 90 TABLET | Refills: 3 | Status: SHIPPED | OUTPATIENT
Start: 2024-12-26 | End: 2025-12-21

## 2024-12-26 RX ORDER — DEXTROSE 4 G
1 TABLET,CHEWABLE ORAL 3 TIMES DAILY PRN
Qty: 1 EACH | Refills: 0 | Status: SHIPPED | OUTPATIENT
Start: 2024-12-26 | End: 2025-01-25

## 2024-12-26 RX ORDER — BLOOD-GLUCOSE CONTROL, NORMAL
1 EACH MISCELLANEOUS DAILY
Qty: 100 EACH | Refills: 3 | Status: SHIPPED | OUTPATIENT
Start: 2024-12-26

## 2024-12-26 RX ORDER — CHOLECALCIFEROL (VITAMIN D3) 50 MCG
50 TABLET ORAL DAILY
Qty: 90 TABLET | Refills: 3 | Status: SHIPPED | OUTPATIENT
Start: 2024-12-26

## 2024-12-26 RX ORDER — METFORMIN HYDROCHLORIDE 500 MG/1
1000 TABLET ORAL
Qty: 360 TABLET | Refills: 3 | Status: SHIPPED | OUTPATIENT
Start: 2024-12-26 | End: 2025-12-21

## 2024-12-27 PROCEDURE — RXMED WILLOW AMBULATORY MEDICATION CHARGE

## 2025-01-02 ENCOUNTER — PHARMACY VISIT (OUTPATIENT)
Dept: PHARMACY | Facility: CLINIC | Age: 50
End: 2025-01-02
Payer: MEDICARE

## 2025-01-02 ENCOUNTER — APPOINTMENT (OUTPATIENT)
Dept: PHARMACY | Facility: HOSPITAL | Age: 50
End: 2025-01-02
Payer: COMMERCIAL

## 2025-01-02 DIAGNOSIS — E11.9 TYPE 2 DIABETES MELLITUS WITHOUT COMPLICATION, WITHOUT LONG-TERM CURRENT USE OF INSULIN (MULTI): ICD-10-CM

## 2025-01-02 NOTE — ASSESSMENT & PLAN NOTE
Is pt at goal? No, 12.5% on 10.2024  Patient's SMBGs are not available.  Rationale for plan: Has been tolerating dose increase of Mounjaro. Has some bowel issues at baseline that seem to be unchanged. Encouraged to monitor this slowly.     Discussed having breakfast and protein shakes and/or bars during work at a minimum if he cannot have full meals to help try to minimize binging in the evenings.    Medication Changes:  CONTINUE  Mounjaro 5mg once weekly      PATIENT EDUCATION/GOALS  Average < 150mg/dL  Time in range > 70%  Fasting B - 130 mg/dL  Postprandial BG: less than 180 mg/dL  A1c: less than 7%    Low and High Blood Sugar  Symptoms of low blood sugar include: Fast heartbeat, shaking, sweating, nervousness or anxiety, irritability or confusion, and/or dizziness.  Symptoms of high blood sugar include: Feeling more thirsty than usual, urinating often, losing weight without trying, presence of ketones in the urine, feeling tired and weak, feeling irritable or having other mood changes, having blurry vision, and/or having slow-healing sores.  If you experience symptoms of low blood sugar (blood sugar less than 70 mg/dL) follow the rule of 15 by eating ~15 g of simple carbohydrates (examples: half cup juice, 3-4 glucose tabs, 1 tablespoon of sugar, honey, or syrup).    Dietary Recommendations  The a lower carbohydrate or Mediterranean diet is often recommended for patients with elevated blood glucose.   Food recommendations:   Focus on whole foods, with as few ingredients as possible.   Focus on lower glycemic foods (GI of 55 or less): this includes most fruits and vegetables, beans, minimally processed grains, dairy, nuts and seeds.  Minimize moderate glycemic index (GI 56 to 69) foods: White and sweet potatoes, corn, white rice, couscous, breakfast cereals such as Cream of Wheat.  Avoid high glycemic index (GI of 70 or higher): White bread, rice cakes, most crackers, bagels, cakes, doughnuts, croissants,  most packaged breakfast cereals.  Include 1-2 servings weekly fatty fish that are low in mercury such as salmon, mackerel, anchovies, sardines, and herring. Avoid frying fish. Bake, steam, or poach.   Avoid trans-fats (fried foods, microwave popcorn, margarine, etc.).   Use oils such as coconut oil, olive oil, avocado oil, or ghee. Select oils appropriate for the temperature you are cooking at.   Avoid processed meats (such as deli meat), canned soups, soy sauce, and fried foods these are all high in added sodium.   The recommended sodium intake for most people is around 2,300 mg/day (too little or too much salt can affect blood pressure).   It is ok to add salt to suit your taste to fresh whole foods (such as vegetables) that you are cooking at home.   Include 4-5 servings daily of both fruits and vegetables. Fruits and vegetables are a good source of fiber, potassium, and magnesium which help support healthy blood pressure.  Focus on eating a variety of colors each day (eating the rainbow- such as red peppers, orange carrots, yellow beans, green lettuce, blue/purple berries, white/brown onions).   Avoid foods with added sugars (goal of <10 grams/serving of added sugar). Limit added sugars to less than 24 (women)-36 (men) grams daily.  Beverage recommendations:    Avoid caffeinated drinks such as coffee, energy drinks, and soda (both regular and diet). Consider sparkling water, water with lemon (or other fruit), or black, oolong, or green tea (prior to noon).   Avoid regular consumption of alcohol. If it is a special occasion the recommended alcohol intake for a male is 2 (men) or 1(women) or less drinks per day.  Alcohol consumption may place people with diabetes at increased risk for delayed hypoglycemia (low blood sugar) especially if taking other medications that may cause hypoglycemia such as insulin.     Lifestyle Recommendations  Avoid tobacco products (including chewing tobacco and vaping).   Continue to  integrate regular movement and enjoyable forms of exercise into your weekly routine. The recommended exercise regimen is 150 minutes per week (for example 5 days per week, 30 minutes per day).   Consider walking for 10-15 minutes after each meal in order to help control blood sugar.   Manage/reduce stress  Consider therapy, mindfulness, breathing exercises (4-7-8 breath), meditation, yoga, journaling, addressing/removing stressors, etc.   Sleep  Goal of 7-9 hours of restful sleep nightly.      Mounjaro Education:  Counseled patient on Mounjaro MOA, expectations, side effects, duration of therapy, administration, and monitoring parameters.  Counseled patient on the benefits of GLP-1ra, such as cardiovascular risk reduction, glycemic control, and weight loss potential.  Provided detailed dosing and administration counseling to ensure proper technique.   Reviewed Mounjaro titration schedule, starting with 2.5 mg once weekly to 5 mg, 7.5mg, 10mg, 12.5mg and if tolerated 15 mg.  Reviewed storage requirements of Mounjaro when not in use, and when to administer the medication if a dose is missed.  Discussed risks of GLP1ra including risk of pancreatitis, MTC and worsening of DR  Advised patient that they may experience improved satiety after meals and portion sizes of meals may be reduced as doses of Mounjaro increase.

## 2025-01-02 NOTE — PROGRESS NOTES
Patient ID: Endy Pruett is a 49 y.o. male who presents for Diabetes.  Pt is here for First appointment.     PCP/Referring Provider: Mile Hurst DO  Last Visit: 11.25.24  Next visit: 2.24.25    Verbal consent to manage patient's drug therapy was obtained from patient. They were informed they may decline to participate or withdraw from participation in pharmacy services at any time.     Patient Assistance for Mounjaro approved through 12.3.2025. Will have to be renewed prior to that date to prevent lapse in coverage. Medication(s) will be received at no cost to patient from [facility].      Subjective   Treatment Adherence:   Preferred pharmacy: [facility]  Can patient afford prescribed medications: No, Mounjaro $$$    Starting weight: 247  Current weight: 243 (fluctuates greatly)      HPI  DIABETES MELLITUS TYPE 2:    Known diabetic complications: none.  Does patient follow with Endocrinology?: no     Last optometry exam: not assessed  Most recent visit in Podiatry: not assessed     Current diabetic medications include:  Mounjaro 5mg once weekly  Metformin 500mg daily    Clarifications to above regimen: has had 2 doses  Adverse Effects: none reported     Home blood glucose records (mg/dL)  FBG: not assessed    Any episodes of hypoglycemia? No, denies .    Did patient treat episode of hypoglycemia appropriately? N/A  Does the patient have a prescription for ready-to-use Glucagon? Not on insulin     Does pt have proteinuria? N/A   If appropriate, is patient on ACEi/ARB? N/A    Secondary Prevention  Statin? Yes  ACE-I/ARB? No  Aspirin? N/A    Pertinent PMH Review:  PMH of Pancreatitis: No  PMH of Retinopathy: No  PMH of Urinary Tract Infections: No  PMH of MTC: No    Lifestyle:  Current exercise: not assessed  Current diet: not assessed        Review of Systems    Objective     BP Readings from Last 4 Encounters:   11/25/24 128/60   10/24/24 138/64      There were no vitals filed for this  visit.     Labs  Creatinine   Date Value Ref Range Status   11/12/2024 0.82 0.50 - 1.30 mg/dL Final     eGFR   Date Value Ref Range Status   11/12/2024 >90 >60 mL/min/1.73m*2 Final     Comment:     Calculations of estimated GFR are performed using the 2021 CKD-EPI Study Refit equation without the race variable for the IDMS-Traceable creatinine methods.  https://jasn.asnjournals.org/content/early/2021/09/22/ASN.3945913960     Sodium   Date Value Ref Range Status   11/12/2024 139 136 - 145 mmol/L Final     Potassium   Date Value Ref Range Status   11/12/2024 4.4 3.5 - 5.3 mmol/L Final     Chloride   Date Value Ref Range Status   11/12/2024 103 98 - 107 mmol/L Final     Urea Nitrogen   Date Value Ref Range Status   11/12/2024 9 6 - 23 mg/dL Final     AST   Date Value Ref Range Status   11/12/2024 20 9 - 39 U/L Final        Lab Results   Component Value Date    BILITOT 1.3 (H) 11/12/2024    CALCIUM 8.6 11/12/2024    CO2 32 11/12/2024     11/12/2024    CREATININE 0.82 11/12/2024    GLUCOSE 177 (H) 11/12/2024    ALKPHOS 48 11/12/2024    K 4.4 11/12/2024    PROT 6.5 11/12/2024     11/12/2024    AST 20 11/12/2024    ALT 20 11/12/2024    BUN 9 11/12/2024    ANIONGAP 8 (L) 11/12/2024    ALBUMIN 4.1 11/12/2024     Lab Results   Component Value Date    TRIG 87 11/12/2024    CHOL 117 11/12/2024    LDLCALC 50 11/12/2024    HDL 49.4 11/12/2024     Lab Results   Component Value Date    HGBA1C 12.5 (A) 10/24/2024       Current Outpatient Medications   Medication Instructions    blood sugar diagnostic strip Test glucose 1 to 3 times a day as needed (for low blood sugar symptoms)    cholecalciferol (VITAMIN D3) 50 mcg, oral, Daily    lancets 30 gauge misc Test glucose 1 to 3 times a day as needed (for low blood sugar symptoms)    metFORMIN (GLUCOPHAGE) 1,000 mg, oral, 2 times daily (morning and late afternoon), USE AS DIRECTED    Mounjaro 5 mg, subcutaneous, Weekly    multivitamin tablet 1 tablet, oral, Daily     rosuvastatin (CRESTOR) 20 mg, oral, Daily        DRUG INTERACTIONS:  None at time of review    Assessment/Plan   Problem List Items Addressed This Visit             ICD-10-CM    Type 2 diabetes mellitus without complication, without long-term current use of insulin (Multi) E11.9     Is pt at goal? No, 12.5% on 10.2024  Patient's SMBGs are not available.  Rationale for plan: Has been tolerating dose increase of Mounjaro. Has some bowel issues at baseline that seem to be unchanged. Encouraged to monitor this slowly.     Discussed having breakfast and protein shakes and/or bars during work at a minimum if he cannot have full meals to help try to minimize binging in the evenings.    Medication Changes:  CONTINUE  Mounjaro 5mg once weekly      PATIENT EDUCATION/GOALS  Average < 150mg/dL  Time in range > 70%  Fasting B - 130 mg/dL  Postprandial BG: less than 180 mg/dL  A1c: less than 7%    Low and High Blood Sugar  Symptoms of low blood sugar include: Fast heartbeat, shaking, sweating, nervousness or anxiety, irritability or confusion, and/or dizziness.  Symptoms of high blood sugar include: Feeling more thirsty than usual, urinating often, losing weight without trying, presence of ketones in the urine, feeling tired and weak, feeling irritable or having other mood changes, having blurry vision, and/or having slow-healing sores.  If you experience symptoms of low blood sugar (blood sugar less than 70 mg/dL) follow the rule of 15 by eating ~15 g of simple carbohydrates (examples: half cup juice, 3-4 glucose tabs, 1 tablespoon of sugar, honey, or syrup).    Dietary Recommendations  The a lower carbohydrate or Mediterranean diet is often recommended for patients with elevated blood glucose.   Food recommendations:   Focus on whole foods, with as few ingredients as possible.   Focus on lower glycemic foods (GI of 55 or less): this includes most fruits and vegetables, beans, minimally processed grains, dairy, nuts and  seeds.  Minimize moderate glycemic index (GI 56 to 69) foods: White and sweet potatoes, corn, white rice, couscous, breakfast cereals such as Cream of Wheat.  Avoid high glycemic index (GI of 70 or higher): White bread, rice cakes, most crackers, bagels, cakes, doughnuts, croissants, most packaged breakfast cereals.  Include 1-2 servings weekly fatty fish that are low in mercury such as salmon, mackerel, anchovies, sardines, and herring. Avoid frying fish. Bake, steam, or poach.   Avoid trans-fats (fried foods, microwave popcorn, margarine, etc.).   Use oils such as coconut oil, olive oil, avocado oil, or ghee. Select oils appropriate for the temperature you are cooking at.   Avoid processed meats (such as deli meat), canned soups, soy sauce, and fried foods these are all high in added sodium.   The recommended sodium intake for most people is around 2,300 mg/day (too little or too much salt can affect blood pressure).   It is ok to add salt to suit your taste to fresh whole foods (such as vegetables) that you are cooking at home.   Include 4-5 servings daily of both fruits and vegetables. Fruits and vegetables are a good source of fiber, potassium, and magnesium which help support healthy blood pressure.  Focus on eating a variety of colors each day (eating the rainbow- such as red peppers, orange carrots, yellow beans, green lettuce, blue/purple berries, white/brown onions).   Avoid foods with added sugars (goal of <10 grams/serving of added sugar). Limit added sugars to less than 24 (women)-36 (men) grams daily.  Beverage recommendations:    Avoid caffeinated drinks such as coffee, energy drinks, and soda (both regular and diet). Consider sparkling water, water with lemon (or other fruit), or black, oolong, or green tea (prior to noon).   Avoid regular consumption of alcohol. If it is a special occasion the recommended alcohol intake for a male is 2 (men) or 1(women) or less drinks per day.  Alcohol consumption  may place people with diabetes at increased risk for delayed hypoglycemia (low blood sugar) especially if taking other medications that may cause hypoglycemia such as insulin.     Lifestyle Recommendations  Avoid tobacco products (including chewing tobacco and vaping).   Continue to integrate regular movement and enjoyable forms of exercise into your weekly routine. The recommended exercise regimen is 150 minutes per week (for example 5 days per week, 30 minutes per day).   Consider walking for 10-15 minutes after each meal in order to help control blood sugar.   Manage/reduce stress  Consider therapy, mindfulness, breathing exercises (4-7-8 breath), meditation, yoga, journaling, addressing/removing stressors, etc.   Sleep  Goal of 7-9 hours of restful sleep nightly.      Mounjaro Education:  Counseled patient on Mounjaro MOA, expectations, side effects, duration of therapy, administration, and monitoring parameters.  Counseled patient on the benefits of GLP-1ra, such as cardiovascular risk reduction, glycemic control, and weight loss potential.  Provided detailed dosing and administration counseling to ensure proper technique.   Reviewed Mounjaro titration schedule, starting with 2.5 mg once weekly to 5 mg, 7.5mg, 10mg, 12.5mg and if tolerated 15 mg.  Reviewed storage requirements of Mounjaro when not in use, and when to administer the medication if a dose is missed.  Discussed risks of GLP1ra including risk of pancreatitis, MTC and worsening of DR  Advised patient that they may experience improved satiety after meals and portion sizes of meals may be reduced as doses of Mounjaro increase.         Relevant Orders    Referral to Clinical Pharmacy       Immunizations needed:  COVID    Labs ordered:  none     Referrals:  none     Follow-up: January 29     Patient was provided with PharmD phone number and encouraged to reach out with any questions or concerns Prior to next appointment or ask provider for another  pharmacy referral.    Time spent with pt: Total length of time 20 (minutes) of the encounter and more than 50% was spent counseling the patient.    Thank you for allowing to take part in the care of this patient.    Diane Escalona PharmD, BARRIE  Clinical Pharmacist  [phone]  [email or web address]    Continue all meds under the continuation of care with the referring provider and clinical pharmacy team.    Verbal consent to manage patient's drug therapy was obtained from the patient. They were informed they may decline to participate or withdraw from participation in pharmacy services at any time.

## 2025-01-22 ENCOUNTER — PATIENT MESSAGE (OUTPATIENT)
Dept: PRIMARY CARE | Facility: CLINIC | Age: 50
End: 2025-01-22
Payer: COMMERCIAL

## 2025-01-22 DIAGNOSIS — L08.9 SKIN INFECTION: Primary | ICD-10-CM

## 2025-01-23 RX ORDER — DOXYCYCLINE 100 MG/1
100 CAPSULE ORAL 2 TIMES DAILY
Qty: 14 CAPSULE | Refills: 0 | Status: SHIPPED | OUTPATIENT
Start: 2025-01-23 | End: 2025-01-30

## 2025-01-29 ENCOUNTER — APPOINTMENT (OUTPATIENT)
Dept: PHARMACY | Facility: HOSPITAL | Age: 50
End: 2025-01-29
Payer: COMMERCIAL

## 2025-01-29 DIAGNOSIS — E11.9 TYPE 2 DIABETES MELLITUS WITHOUT COMPLICATION, WITHOUT LONG-TERM CURRENT USE OF INSULIN (MULTI): ICD-10-CM

## 2025-01-29 PROCEDURE — RXMED WILLOW AMBULATORY MEDICATION CHARGE

## 2025-01-29 RX ORDER — TIRZEPATIDE 7.5 MG/.5ML
7.5 INJECTION, SOLUTION SUBCUTANEOUS WEEKLY
Qty: 2 ML | Refills: 2 | Status: SHIPPED | OUTPATIENT
Start: 2025-01-29

## 2025-01-29 RX ORDER — METFORMIN HYDROCHLORIDE 500 MG/1
1000 TABLET ORAL
Qty: 360 TABLET | Refills: 3 | Status: SHIPPED | OUTPATIENT
Start: 2025-01-29

## 2025-01-29 NOTE — ASSESSMENT & PLAN NOTE
Is pt at goal? No, 12.5% on 10.2024  Patient's SMBGs are at goal  Sugars are limited but controlled. Will increase mounjaro for additional weight loss benefit. Instructed to closely monitor food intake vs GI symptoms.     Medication Changes:  INCREASE  Mounjaro to 7.5mg once weekly      PATIENT EDUCATION/GOALS  Average < 150mg/dL  Time in range > 70%  Fasting B - 130 mg/dL  Postprandial BG: less than 180 mg/dL  A1c: less than 7%    Low and High Blood Sugar  Symptoms of low blood sugar include: Fast heartbeat, shaking, sweating, nervousness or anxiety, irritability or confusion, and/or dizziness.  Symptoms of high blood sugar include: Feeling more thirsty than usual, urinating often, losing weight without trying, presence of ketones in the urine, feeling tired and weak, feeling irritable or having other mood changes, having blurry vision, and/or having slow-healing sores.  If you experience symptoms of low blood sugar (blood sugar less than 70 mg/dL) follow the rule of 15 by eating ~15 g of simple carbohydrates (examples: half cup juice, 3-4 glucose tabs, 1 tablespoon of sugar, honey, or syrup).    Dietary Recommendations  The a lower carbohydrate or Mediterranean diet is often recommended for patients with elevated blood glucose.   Food recommendations:   Focus on whole foods, with as few ingredients as possible.   Focus on lower glycemic foods (GI of 55 or less): this includes most fruits and vegetables, beans, minimally processed grains, dairy, nuts and seeds.  Minimize moderate glycemic index (GI 56 to 69) foods: White and sweet potatoes, corn, white rice, couscous, breakfast cereals such as Cream of Wheat.  Avoid high glycemic index (GI of 70 or higher): White bread, rice cakes, most crackers, bagels, cakes, doughnuts, croissants, most packaged breakfast cereals.  Include 1-2 servings weekly fatty fish that are low in mercury such as salmon, mackerel, anchovies, sardines, and herring. Avoid frying fish.  Bake, steam, or poach.   Avoid trans-fats (fried foods, microwave popcorn, margarine, etc.).   Use oils such as coconut oil, olive oil, avocado oil, or ghee. Select oils appropriate for the temperature you are cooking at.   Avoid processed meats (such as deli meat), canned soups, soy sauce, and fried foods these are all high in added sodium.   The recommended sodium intake for most people is around 2,300 mg/day (too little or too much salt can affect blood pressure).   It is ok to add salt to suit your taste to fresh whole foods (such as vegetables) that you are cooking at home.   Include 4-5 servings daily of both fruits and vegetables. Fruits and vegetables are a good source of fiber, potassium, and magnesium which help support healthy blood pressure.  Focus on eating a variety of colors each day (eating the rainbow- such as red peppers, orange carrots, yellow beans, green lettuce, blue/purple berries, white/brown onions).   Avoid foods with added sugars (goal of <10 grams/serving of added sugar). Limit added sugars to less than 24 (women)-36 (men) grams daily.  Beverage recommendations:    Avoid caffeinated drinks such as coffee, energy drinks, and soda (both regular and diet). Consider sparkling water, water with lemon (or other fruit), or black, oolong, or green tea (prior to noon).   Avoid regular consumption of alcohol. If it is a special occasion the recommended alcohol intake for a male is 2 (men) or 1(women) or less drinks per day.  Alcohol consumption may place people with diabetes at increased risk for delayed hypoglycemia (low blood sugar) especially if taking other medications that may cause hypoglycemia such as insulin.     Lifestyle Recommendations  Avoid tobacco products (including chewing tobacco and vaping).   Continue to integrate regular movement and enjoyable forms of exercise into your weekly routine. The recommended exercise regimen is 150 minutes per week (for example 5 days per week, 30  minutes per day).   Consider walking for 10-15 minutes after each meal in order to help control blood sugar.   Manage/reduce stress  Consider therapy, mindfulness, breathing exercises (4-7-8 breath), meditation, yoga, journaling, addressing/removing stressors, etc.   Sleep  Goal of 7-9 hours of restful sleep nightly.      Mounjaro Education:  Counseled patient on Mounjaro MOA, expectations, side effects, duration of therapy, administration, and monitoring parameters.  Counseled patient on the benefits of GLP-1ra, such as cardiovascular risk reduction, glycemic control, and weight loss potential.  Provided detailed dosing and administration counseling to ensure proper technique.   Reviewed Mounjaro titration schedule, starting with 2.5 mg once weekly to 5 mg, 7.5mg, 10mg, 12.5mg and if tolerated 15 mg.  Reviewed storage requirements of Mounjaro when not in use, and when to administer the medication if a dose is missed.  Discussed risks of GLP1ra including risk of pancreatitis, MTC and worsening of DR  Advised patient that they may experience improved satiety after meals and portion sizes of meals may be reduced as doses of Mounjaro increase.

## 2025-01-29 NOTE — PROGRESS NOTES
Patient ID: Endy Pruett is a 49 y.o. male who presents for Diabetes.  Pt is here for First appointment.     PCP/Referring Provider: Mile Hurst DO  Last Visit: 24  Next visit: 25    Verbal consent to manage patient's drug therapy was obtained from patient. They were informed they may decline to participate or withdraw from participation in pharmacy services at any time.     Patient Assistance for Mounjaro approved through 12.3.2025. Will have to be renewed prior to that date to prevent lapse in coverage. Medication(s) will be received at no cost to patient from [facility].      Subjective   Treatment Adherence:   Preferred pharmacy: [facility]  Can patient afford prescribed medications: No, Mounjaro $$$    Starting weight: 247lb  Last weight: 243lb (fluctuates greatly)  Current weight: 240-245lb      HPI  DIABETES MELLITUS TYPE 2:    Known diabetic complications: none.  Does patient follow with Endocrinology?: no     Last optometry exam: not assessed  Most recent visit in Podiatry: not assessed     Current diabetic medications include:  Mounjaro 5mg once weekly  Metformin 1G twice daily     Clarifications to above regimen: none  Adverse Effects: no more diarrhea,     Home blood glucose records (mg/dL)  FB-12  PPBs    Any episodes of hypoglycemia? No, denies .    Did patient treat episode of hypoglycemia appropriately? N/A  Does the patient have a prescription for ready-to-use Glucagon? Not on insulin     Does pt have proteinuria? N/A   If appropriate, is patient on ACEi/ARB? N/A    Secondary Prevention  Statin? Yes  ACE-I/ARB? No  Aspirin? N/A    Pertinent PMH Review:  PMH of Pancreatitis: No  PMH of Retinopathy: No  PMH of Urinary Tract Infections: No  PMH of MTC: No    Lifestyle:  Current exercise: not assessed  Current diet: feels appetite is suppressed; must have smaller meals  Trying to prioritize proteins  Granola bars in the AM (NV oats and honey, peanut  butter), no protein shakes yet        Review of Systems    Objective     BP Readings from Last 4 Encounters:   11/25/24 128/60   10/24/24 138/64      There were no vitals filed for this visit.     Labs  Creatinine   Date Value Ref Range Status   11/12/2024 0.82 0.50 - 1.30 mg/dL Final     eGFR   Date Value Ref Range Status   11/12/2024 >90 >60 mL/min/1.73m*2 Final     Comment:     Calculations of estimated GFR are performed using the 2021 CKD-EPI Study Refit equation without the race variable for the IDMS-Traceable creatinine methods.  https://jasn.asnjournals.org/content/early/2021/09/22/ASN.0058806992     Sodium   Date Value Ref Range Status   11/12/2024 139 136 - 145 mmol/L Final     Potassium   Date Value Ref Range Status   11/12/2024 4.4 3.5 - 5.3 mmol/L Final     Chloride   Date Value Ref Range Status   11/12/2024 103 98 - 107 mmol/L Final     Urea Nitrogen   Date Value Ref Range Status   11/12/2024 9 6 - 23 mg/dL Final     AST   Date Value Ref Range Status   11/12/2024 20 9 - 39 U/L Final        Lab Results   Component Value Date    BILITOT 1.3 (H) 11/12/2024    CALCIUM 8.6 11/12/2024    CO2 32 11/12/2024     11/12/2024    CREATININE 0.82 11/12/2024    GLUCOSE 177 (H) 11/12/2024    ALKPHOS 48 11/12/2024    K 4.4 11/12/2024    PROT 6.5 11/12/2024     11/12/2024    AST 20 11/12/2024    ALT 20 11/12/2024    BUN 9 11/12/2024    ANIONGAP 8 (L) 11/12/2024    ALBUMIN 4.1 11/12/2024     Lab Results   Component Value Date    TRIG 87 11/12/2024    CHOL 117 11/12/2024    LDLCALC 50 11/12/2024    HDL 49.4 11/12/2024     Lab Results   Component Value Date    HGBA1C 12.5 (A) 10/24/2024       Current Outpatient Medications   Medication Instructions    blood sugar diagnostic strip Test glucose 1 to 3 times a day as needed (for low blood sugar symptoms)    cholecalciferol (VITAMIN D3) 50 mcg, oral, Daily    doxycycline (VIBRAMYCIN) 100 mg, oral, 2 times daily, Take with at least 8 ounces (large glass) of water, do  not lie down for 30 minutes after    lancets 30 gauge misc Test glucose 1 to 3 times a day as needed (for low blood sugar symptoms)    metFORMIN (GLUCOPHAGE) 1,000 mg, oral, 2 times daily (morning and late afternoon), USE AS DIRECTED    Mounjaro 7.5 mg, subcutaneous, Weekly    multivitamin tablet 1 tablet, oral, Daily    rosuvastatin (CRESTOR) 20 mg, oral, Daily        DRUG INTERACTIONS:  None at time of review    Assessment/Plan   Problem List Items Addressed This Visit             ICD-10-CM    Type 2 diabetes mellitus without complication, without long-term current use of insulin (Multi) E11.9     Is pt at goal? No, 12.5% on 10.2024  Patient's SMBGs are at goal  Sugars are limited but controlled. Will increase mounjaro for additional weight loss benefit. Instructed to closely monitor food intake vs GI symptoms.     Medication Changes:  INCREASE  Mounjaro to 7.5mg once weekly      PATIENT EDUCATION/GOALS  Average < 150mg/dL  Time in range > 70%  Fasting B - 130 mg/dL  Postprandial BG: less than 180 mg/dL  A1c: less than 7%    Low and High Blood Sugar  Symptoms of low blood sugar include: Fast heartbeat, shaking, sweating, nervousness or anxiety, irritability or confusion, and/or dizziness.  Symptoms of high blood sugar include: Feeling more thirsty than usual, urinating often, losing weight without trying, presence of ketones in the urine, feeling tired and weak, feeling irritable or having other mood changes, having blurry vision, and/or having slow-healing sores.  If you experience symptoms of low blood sugar (blood sugar less than 70 mg/dL) follow the rule of 15 by eating ~15 g of simple carbohydrates (examples: half cup juice, 3-4 glucose tabs, 1 tablespoon of sugar, honey, or syrup).    Dietary Recommendations  The a lower carbohydrate or Mediterranean diet is often recommended for patients with elevated blood glucose.   Food recommendations:   Focus on whole foods, with as few ingredients as possible.    Focus on lower glycemic foods (GI of 55 or less): this includes most fruits and vegetables, beans, minimally processed grains, dairy, nuts and seeds.  Minimize moderate glycemic index (GI 56 to 69) foods: White and sweet potatoes, corn, white rice, couscous, breakfast cereals such as Cream of Wheat.  Avoid high glycemic index (GI of 70 or higher): White bread, rice cakes, most crackers, bagels, cakes, doughnuts, croissants, most packaged breakfast cereals.  Include 1-2 servings weekly fatty fish that are low in mercury such as salmon, mackerel, anchovies, sardines, and herring. Avoid frying fish. Bake, steam, or poach.   Avoid trans-fats (fried foods, microwave popcorn, margarine, etc.).   Use oils such as coconut oil, olive oil, avocado oil, or ghee. Select oils appropriate for the temperature you are cooking at.   Avoid processed meats (such as deli meat), canned soups, soy sauce, and fried foods these are all high in added sodium.   The recommended sodium intake for most people is around 2,300 mg/day (too little or too much salt can affect blood pressure).   It is ok to add salt to suit your taste to fresh whole foods (such as vegetables) that you are cooking at home.   Include 4-5 servings daily of both fruits and vegetables. Fruits and vegetables are a good source of fiber, potassium, and magnesium which help support healthy blood pressure.  Focus on eating a variety of colors each day (eating the rainbow- such as red peppers, orange carrots, yellow beans, green lettuce, blue/purple berries, white/brown onions).   Avoid foods with added sugars (goal of <10 grams/serving of added sugar). Limit added sugars to less than 24 (women)-36 (men) grams daily.  Beverage recommendations:    Avoid caffeinated drinks such as coffee, energy drinks, and soda (both regular and diet). Consider sparkling water, water with lemon (or other fruit), or black, oolong, or green tea (prior to noon).   Avoid regular consumption of  alcohol. If it is a special occasion the recommended alcohol intake for a male is 2 (men) or 1(women) or less drinks per day.  Alcohol consumption may place people with diabetes at increased risk for delayed hypoglycemia (low blood sugar) especially if taking other medications that may cause hypoglycemia such as insulin.     Lifestyle Recommendations  Avoid tobacco products (including chewing tobacco and vaping).   Continue to integrate regular movement and enjoyable forms of exercise into your weekly routine. The recommended exercise regimen is 150 minutes per week (for example 5 days per week, 30 minutes per day).   Consider walking for 10-15 minutes after each meal in order to help control blood sugar.   Manage/reduce stress  Consider therapy, mindfulness, breathing exercises (4-7-8 breath), meditation, yoga, journaling, addressing/removing stressors, etc.   Sleep  Goal of 7-9 hours of restful sleep nightly.      Mounjaro Education:  Counseled patient on Mounjaro MOA, expectations, side effects, duration of therapy, administration, and monitoring parameters.  Counseled patient on the benefits of GLP-1ra, such as cardiovascular risk reduction, glycemic control, and weight loss potential.  Provided detailed dosing and administration counseling to ensure proper technique.   Reviewed Mounjaro titration schedule, starting with 2.5 mg once weekly to 5 mg, 7.5mg, 10mg, 12.5mg and if tolerated 15 mg.  Reviewed storage requirements of Mounjaro when not in use, and when to administer the medication if a dose is missed.  Discussed risks of GLP1ra including risk of pancreatitis, MTC and worsening of DR  Advised patient that they may experience improved satiety after meals and portion sizes of meals may be reduced as doses of Mounjaro increase.         Relevant Medications    metFORMIN (Glucophage) 500 mg tablet    tirzepatide (Mounjaro) 7.5 mg/0.5 mL pen injector    Other Relevant Orders    Referral to Clinical Pharmacy      Immunizations needed:  COVID    Labs ordered:  none     Referrals:  none     Follow-up: March     Patient was provided with PharmD phone number and encouraged to reach out with any questions or concerns Prior to next appointment or ask provider for another pharmacy referral.    Time spent with pt: Total length of time 12 (minutes) of the encounter and more than 50% was spent counseling the patient.    Thank you for allowing to take part in the care of this patient.    Diane Escalona PharmD, BARRIE  Clinical Pharmacist  [phone]  [email or web address]    Continue all meds under the continuation of care with the referring provider and clinical pharmacy team.    Verbal consent to manage patient's drug therapy was obtained from the patient. They were informed they may decline to participate or withdraw from participation in pharmacy services at any time.

## 2025-02-04 ENCOUNTER — PHARMACY VISIT (OUTPATIENT)
Dept: PHARMACY | Facility: CLINIC | Age: 50
End: 2025-02-04
Payer: MEDICARE

## 2025-02-06 PROCEDURE — RXMED WILLOW AMBULATORY MEDICATION CHARGE

## 2025-02-08 ENCOUNTER — PHARMACY VISIT (OUTPATIENT)
Dept: PHARMACY | Facility: CLINIC | Age: 50
End: 2025-02-08
Payer: MEDICARE

## 2025-02-24 ENCOUNTER — APPOINTMENT (OUTPATIENT)
Dept: PRIMARY CARE | Facility: CLINIC | Age: 50
End: 2025-02-24
Payer: COMMERCIAL

## 2025-02-25 PROCEDURE — RXMED WILLOW AMBULATORY MEDICATION CHARGE

## 2025-03-03 ENCOUNTER — PHARMACY VISIT (OUTPATIENT)
Dept: PHARMACY | Facility: CLINIC | Age: 50
End: 2025-03-03
Payer: MEDICARE

## 2025-03-03 NOTE — PROGRESS NOTES
Patient ID: Endy Pruett is a 49 y.o. male who presents for Diabetes.  Pt is here for First appointment.     PCP/Referring Provider: Mile Hurst DO  Last Visit: 11.25.24  Next visit: 2.24.25    Verbal consent to manage patient's drug therapy was obtained from patient. They were informed they may decline to participate or withdraw from participation in pharmacy services at any time.     Patient Assistance for Mounjaro approved through 12.3.2025. Will have to be renewed prior to that date to prevent lapse in coverage. Medication(s) will be received at no cost to patient from [facility].      Subjective   Treatment Adherence:   Preferred pharmacy: [facility]  Can patient afford prescribed medications: No, Mounjaro $$$    Starting weight: 247lb  Last weight: 240-245lb (fluctuates greatly)  Current weight: 241      HPI  DIABETES MELLITUS TYPE 2:    Known diabetic complications: none.  Does patient follow with Endocrinology?: no     Last optometry exam: not assessed  Most recent visit in Podiatry: not assessed     Current diabetic medications include:  Mounjaro 7.5mg once weekly - Thur evening  Metformin 1G twice daily     Clarifications to above regimen: none  Adverse Effects: Fri morning diarrhea - constipation rest of week    Home blood glucose records (mg/dL)  Max 130    Any episodes of hypoglycemia? No, denies .    Did patient treat episode of hypoglycemia appropriately? N/A  Does the patient have a prescription for ready-to-use Glucagon? Not on insulin     Does pt have proteinuria? N/A   If appropriate, is patient on ACEi/ARB? N/A    Secondary Prevention  Statin? Yes  ACE-I/ARB? No  Aspirin? N/A    Pertinent PMH Review:  PMH of Pancreatitis: No  PMH of Retinopathy: No  PMH of Urinary Tract Infections: No  PMH of MTC: No    Lifestyle:  Current exercise: is a , on his feet  Current diet: feels appetite is suppressed; must have smaller meals  Trying to prioritize proteins  Granola bars in  the AM (NV oats and honey, peanut butter), no protein shakes yet        Review of Systems    Objective     BP Readings from Last 4 Encounters:   11/25/24 128/60   10/24/24 138/64      There were no vitals filed for this visit.     Labs  Creatinine   Date Value Ref Range Status   11/12/2024 0.82 0.50 - 1.30 mg/dL Final     eGFR   Date Value Ref Range Status   11/12/2024 >90 >60 mL/min/1.73m*2 Final     Comment:     Calculations of estimated GFR are performed using the 2021 CKD-EPI Study Refit equation without the race variable for the IDMS-Traceable creatinine methods.  https://jasn.asnjournals.org/content/early/2021/09/22/ASN.8977770477     Sodium   Date Value Ref Range Status   11/12/2024 139 136 - 145 mmol/L Final     Potassium   Date Value Ref Range Status   11/12/2024 4.4 3.5 - 5.3 mmol/L Final     Chloride   Date Value Ref Range Status   11/12/2024 103 98 - 107 mmol/L Final     Urea Nitrogen   Date Value Ref Range Status   11/12/2024 9 6 - 23 mg/dL Final     AST   Date Value Ref Range Status   03/03/2025 24 10 - 40 U/L Final        Lab Results   Component Value Date    BILITOT 1.1 03/03/2025    CALCIUM 8.6 11/12/2024    CO2 32 11/12/2024     11/12/2024    CREATININE 0.82 11/12/2024    GLUCOSE 177 (H) 11/12/2024    ALKPHOS 42 03/03/2025    K 4.4 11/12/2024    PROT 6.8 03/03/2025     11/12/2024    AST 24 03/03/2025    ALT 25 03/03/2025    BUN 9 11/12/2024    ANIONGAP 8 (L) 11/12/2024    ALBUMIN 4.5 03/03/2025     Lab Results   Component Value Date    TRIG 87 11/12/2024    CHOL 117 11/12/2024    LDLCALC 50 11/12/2024    HDL 49.4 11/12/2024     Lab Results   Component Value Date    HGBA1C 6.6 (H) 03/03/2025       Current Outpatient Medications   Medication Instructions    blood sugar diagnostic strip Test glucose 1 to 3 times a day as needed (for low blood sugar symptoms)    cholecalciferol (VITAMIN D3) 50 mcg, oral, Daily    lancets 30 gauge misc Test glucose 1 to 3 times a day as needed (for low blood  sugar symptoms)    metFORMIN (GLUCOPHAGE) 1,000 mg, oral, 2 times daily (morning and late afternoon), USE AS DIRECTED    Mounjaro 7.5 mg, subcutaneous, Weekly    multivitamin tablet 1 tablet, oral, Daily    rosuvastatin (CRESTOR) 20 mg, oral, Daily        DRUG INTERACTIONS:  None at time of review    Assessment/Plan   Problem List Items Addressed This Visit             ICD-10-CM    Type 2 diabetes mellitus without complication, without long-term current use of insulin (Multi) E11.9     Is pt at goal? Yes, 6.6%  Patient's SMBGs are at goal. Reports diarrhea day after injection and then constipation. States he feels the need to pass bowel movement but can't. No patterns identified related to food intake. Encouraged increased food intake in addition to fiber supplement. May also trial miralax or colace.     Medication Changes:  CONTINUE  Mounjaro to 7.5mg once weekly      PATIENT EDUCATION/GOALS  Average < 150mg/dL  Time in range > 70%  Fasting B - 130 mg/dL  Postprandial BG: less than 180 mg/dL  A1c: less than 7%    Low and High Blood Sugar  Symptoms of low blood sugar include: Fast heartbeat, shaking, sweating, nervousness or anxiety, irritability or confusion, and/or dizziness.  Symptoms of high blood sugar include: Feeling more thirsty than usual, urinating often, losing weight without trying, presence of ketones in the urine, feeling tired and weak, feeling irritable or having other mood changes, having blurry vision, and/or having slow-healing sores.  If you experience symptoms of low blood sugar (blood sugar less than 70 mg/dL) follow the rule of 15 by eating ~15 g of simple carbohydrates (examples: half cup juice, 3-4 glucose tabs, 1 tablespoon of sugar, honey, or syrup).    Dietary Recommendations  The a lower carbohydrate or Mediterranean diet is often recommended for patients with elevated blood glucose.   Food recommendations:   Focus on whole foods, with as few ingredients as possible.   Focus on  lower glycemic foods (GI of 55 or less): this includes most fruits and vegetables, beans, minimally processed grains, dairy, nuts and seeds.  Minimize moderate glycemic index (GI 56 to 69) foods: White and sweet potatoes, corn, white rice, couscous, breakfast cereals such as Cream of Wheat.  Avoid high glycemic index (GI of 70 or higher): White bread, rice cakes, most crackers, bagels, cakes, doughnuts, croissants, most packaged breakfast cereals.  Include 1-2 servings weekly fatty fish that are low in mercury such as salmon, mackerel, anchovies, sardines, and herring. Avoid frying fish. Bake, steam, or poach.   Avoid trans-fats (fried foods, microwave popcorn, margarine, etc.).   Use oils such as coconut oil, olive oil, avocado oil, or ghee. Select oils appropriate for the temperature you are cooking at.   Avoid processed meats (such as deli meat), canned soups, soy sauce, and fried foods these are all high in added sodium.   The recommended sodium intake for most people is around 2,300 mg/day (too little or too much salt can affect blood pressure).   It is ok to add salt to suit your taste to fresh whole foods (such as vegetables) that you are cooking at home.   Include 4-5 servings daily of both fruits and vegetables. Fruits and vegetables are a good source of fiber, potassium, and magnesium which help support healthy blood pressure.  Focus on eating a variety of colors each day (eating the rainbow- such as red peppers, orange carrots, yellow beans, green lettuce, blue/purple berries, white/brown onions).   Avoid foods with added sugars (goal of <10 grams/serving of added sugar). Limit added sugars to less than 24 (women)-36 (men) grams daily.  Beverage recommendations:    Avoid caffeinated drinks such as coffee, energy drinks, and soda (both regular and diet). Consider sparkling water, water with lemon (or other fruit), or black, oolong, or green tea (prior to noon).   Avoid regular consumption of alcohol. If it  is a special occasion the recommended alcohol intake for a male is 2 (men) or 1(women) or less drinks per day.  Alcohol consumption may place people with diabetes at increased risk for delayed hypoglycemia (low blood sugar) especially if taking other medications that may cause hypoglycemia such as insulin.     Lifestyle Recommendations  Avoid tobacco products (including chewing tobacco and vaping).   Continue to integrate regular movement and enjoyable forms of exercise into your weekly routine. The recommended exercise regimen is 150 minutes per week (for example 5 days per week, 30 minutes per day).   Consider walking for 10-15 minutes after each meal in order to help control blood sugar.   Manage/reduce stress  Consider therapy, mindfulness, breathing exercises (4-7-8 breath), meditation, yoga, journaling, addressing/removing stressors, etc.   Sleep  Goal of 7-9 hours of restful sleep nightly.      Mounjaro Education:  Counseled patient on Mounjaro MOA, expectations, side effects, duration of therapy, administration, and monitoring parameters.  Counseled patient on the benefits of GLP-1ra, such as cardiovascular risk reduction, glycemic control, and weight loss potential.  Provided detailed dosing and administration counseling to ensure proper technique.   Reviewed Mounjaro titration schedule, starting with 2.5 mg once weekly to 5 mg, 7.5mg, 10mg, 12.5mg and if tolerated 15 mg.  Reviewed storage requirements of Mounjaro when not in use, and when to administer the medication if a dose is missed.  Discussed risks of GLP1ra including risk of pancreatitis, MTC and worsening of DR  Advised patient that they may experience improved satiety after meals and portion sizes of meals may be reduced as doses of Mounjaro increase.         Relevant Orders    Referral to Clinical Pharmacy       Immunizations needed:  COVID    Labs ordered:  none     Referrals:  none     Follow-up: 4 weeks     Patient was provided with PharmD  phone number and encouraged to reach out with any questions or concerns Prior to next appointment or ask provider for another pharmacy referral.    Time spent with pt: Total length of time 10 (minutes) of the encounter and more than 50% was spent counseling the patient.    Thank you for allowing to take part in the care of this patient.    Diane Escalona PharmD, BARRIE  Clinical Pharmacist  [phone]  [email or web address]    Continue all meds under the continuation of care with the referring provider and clinical pharmacy team.    Verbal consent to manage patient's drug therapy was obtained from the patient. They were informed they may decline to participate or withdraw from participation in pharmacy services at any time.

## 2025-03-04 LAB
25(OH)D3+25(OH)D2 SERPL-MCNC: 40 NG/ML (ref 30–100)
ALBUMIN SERPL-MCNC: 4.5 G/DL (ref 3.6–5.1)
ALBUMIN/GLOB SERPL: 2 (CALC) (ref 1–2.5)
ALP SERPL-CCNC: 42 U/L (ref 36–130)
ALT SERPL-CCNC: 25 U/L (ref 9–46)
AST SERPL-CCNC: 24 U/L (ref 10–40)
BILIRUB DIRECT SERPL-MCNC: 0.3 MG/DL
BILIRUB INDIRECT SERPL-MCNC: 0.8 MG/DL (CALC) (ref 0.2–1.2)
BILIRUB SERPL-MCNC: 1.1 MG/DL (ref 0.2–1.2)
EST. AVERAGE GLUCOSE BLD GHB EST-MCNC: 143 MG/DL
EST. AVERAGE GLUCOSE BLD GHB EST-SCNC: 7.9 MMOL/L
GLOBULIN SER CALC-MCNC: 2.3 G/DL (CALC) (ref 1.9–3.7)
HBA1C MFR BLD: 6.6 % OF TOTAL HGB
PROT SERPL-MCNC: 6.8 G/DL (ref 6.1–8.1)

## 2025-03-05 ENCOUNTER — APPOINTMENT (OUTPATIENT)
Dept: PHARMACY | Facility: HOSPITAL | Age: 50
End: 2025-03-05
Payer: COMMERCIAL

## 2025-03-05 DIAGNOSIS — E11.9 TYPE 2 DIABETES MELLITUS WITHOUT COMPLICATION, WITHOUT LONG-TERM CURRENT USE OF INSULIN (MULTI): ICD-10-CM

## 2025-03-05 NOTE — ASSESSMENT & PLAN NOTE
Is pt at goal? Yes, 6.6%  Patient's SMBGs are at goal. Reports diarrhea day after injection and then constipation. States he feels the need to pass bowel movement but can't. No patterns identified related to food intake. Encouraged increased food intake in addition to fiber supplement. May also trial miralax or colace.     Medication Changes:  CONTINUE  Mounjaro to 7.5mg once weekly      PATIENT EDUCATION/GOALS  Average < 150mg/dL  Time in range > 70%  Fasting B - 130 mg/dL  Postprandial BG: less than 180 mg/dL  A1c: less than 7%    Low and High Blood Sugar  Symptoms of low blood sugar include: Fast heartbeat, shaking, sweating, nervousness or anxiety, irritability or confusion, and/or dizziness.  Symptoms of high blood sugar include: Feeling more thirsty than usual, urinating often, losing weight without trying, presence of ketones in the urine, feeling tired and weak, feeling irritable or having other mood changes, having blurry vision, and/or having slow-healing sores.  If you experience symptoms of low blood sugar (blood sugar less than 70 mg/dL) follow the rule of 15 by eating ~15 g of simple carbohydrates (examples: half cup juice, 3-4 glucose tabs, 1 tablespoon of sugar, honey, or syrup).    Dietary Recommendations  The a lower carbohydrate or Mediterranean diet is often recommended for patients with elevated blood glucose.   Food recommendations:   Focus on whole foods, with as few ingredients as possible.   Focus on lower glycemic foods (GI of 55 or less): this includes most fruits and vegetables, beans, minimally processed grains, dairy, nuts and seeds.  Minimize moderate glycemic index (GI 56 to 69) foods: White and sweet potatoes, corn, white rice, couscous, breakfast cereals such as Cream of Wheat.  Avoid high glycemic index (GI of 70 or higher): White bread, rice cakes, most crackers, bagels, cakes, doughnuts, croissants, most packaged breakfast cereals.  Include 1-2 servings weekly fatty fish  that are low in mercury such as salmon, mackerel, anchovies, sardines, and herring. Avoid frying fish. Bake, steam, or poach.   Avoid trans-fats (fried foods, microwave popcorn, margarine, etc.).   Use oils such as coconut oil, olive oil, avocado oil, or ghee. Select oils appropriate for the temperature you are cooking at.   Avoid processed meats (such as deli meat), canned soups, soy sauce, and fried foods these are all high in added sodium.   The recommended sodium intake for most people is around 2,300 mg/day (too little or too much salt can affect blood pressure).   It is ok to add salt to suit your taste to fresh whole foods (such as vegetables) that you are cooking at home.   Include 4-5 servings daily of both fruits and vegetables. Fruits and vegetables are a good source of fiber, potassium, and magnesium which help support healthy blood pressure.  Focus on eating a variety of colors each day (eating the rainbow- such as red peppers, orange carrots, yellow beans, green lettuce, blue/purple berries, white/brown onions).   Avoid foods with added sugars (goal of <10 grams/serving of added sugar). Limit added sugars to less than 24 (women)-36 (men) grams daily.  Beverage recommendations:    Avoid caffeinated drinks such as coffee, energy drinks, and soda (both regular and diet). Consider sparkling water, water with lemon (or other fruit), or black, oolong, or green tea (prior to noon).   Avoid regular consumption of alcohol. If it is a special occasion the recommended alcohol intake for a male is 2 (men) or 1(women) or less drinks per day.  Alcohol consumption may place people with diabetes at increased risk for delayed hypoglycemia (low blood sugar) especially if taking other medications that may cause hypoglycemia such as insulin.     Lifestyle Recommendations  Avoid tobacco products (including chewing tobacco and vaping).   Continue to integrate regular movement and enjoyable forms of exercise into your weekly  routine. The recommended exercise regimen is 150 minutes per week (for example 5 days per week, 30 minutes per day).   Consider walking for 10-15 minutes after each meal in order to help control blood sugar.   Manage/reduce stress  Consider therapy, mindfulness, breathing exercises (4-7-8 breath), meditation, yoga, journaling, addressing/removing stressors, etc.   Sleep  Goal of 7-9 hours of restful sleep nightly.      Mounjaro Education:  Counseled patient on Mounjaro MOA, expectations, side effects, duration of therapy, administration, and monitoring parameters.  Counseled patient on the benefits of GLP-1ra, such as cardiovascular risk reduction, glycemic control, and weight loss potential.  Provided detailed dosing and administration counseling to ensure proper technique.   Reviewed Mounjaro titration schedule, starting with 2.5 mg once weekly to 5 mg, 7.5mg, 10mg, 12.5mg and if tolerated 15 mg.  Reviewed storage requirements of Mounjaro when not in use, and when to administer the medication if a dose is missed.  Discussed risks of GLP1ra including risk of pancreatitis, MTC and worsening of DR  Advised patient that they may experience improved satiety after meals and portion sizes of meals may be reduced as doses of Mounjaro increase.

## 2025-03-10 ENCOUNTER — APPOINTMENT (OUTPATIENT)
Dept: PRIMARY CARE | Facility: CLINIC | Age: 50
End: 2025-03-10
Payer: COMMERCIAL

## 2025-03-10 VITALS
HEART RATE: 74 BPM | OXYGEN SATURATION: 98 % | TEMPERATURE: 96.6 F | SYSTOLIC BLOOD PRESSURE: 140 MMHG | RESPIRATION RATE: 19 BRPM | WEIGHT: 247 LBS | HEIGHT: 70 IN | BODY MASS INDEX: 35.36 KG/M2 | DIASTOLIC BLOOD PRESSURE: 86 MMHG

## 2025-03-10 DIAGNOSIS — E55.9 VITAMIN D DEFICIENCY: ICD-10-CM

## 2025-03-10 DIAGNOSIS — H91.93 BILATERAL HEARING LOSS, UNSPECIFIED HEARING LOSS TYPE: ICD-10-CM

## 2025-03-10 DIAGNOSIS — E66.812 OBESITY, CLASS II, BMI 35-39.9: ICD-10-CM

## 2025-03-10 DIAGNOSIS — E11.9 TYPE 2 DIABETES MELLITUS WITHOUT COMPLICATION, WITHOUT LONG-TERM CURRENT USE OF INSULIN (MULTI): Primary | ICD-10-CM

## 2025-03-10 DIAGNOSIS — N52.9 ERECTILE DYSFUNCTION, UNSPECIFIED ERECTILE DYSFUNCTION TYPE: ICD-10-CM

## 2025-03-10 PROBLEM — E80.6 HYPERBILIRUBINEMIA: Status: RESOLVED | Noted: 2024-11-25 | Resolved: 2025-03-10

## 2025-03-10 PROCEDURE — RXMED WILLOW AMBULATORY MEDICATION CHARGE

## 2025-03-10 PROCEDURE — 1036F TOBACCO NON-USER: CPT

## 2025-03-10 PROCEDURE — 99214 OFFICE O/P EST MOD 30 MIN: CPT

## 2025-03-10 PROCEDURE — 3079F DIAST BP 80-89 MM HG: CPT

## 2025-03-10 PROCEDURE — 3008F BODY MASS INDEX DOCD: CPT

## 2025-03-10 PROCEDURE — 3075F SYST BP GE 130 - 139MM HG: CPT

## 2025-03-10 RX ORDER — TADALAFIL 10 MG/1
10 TABLET ORAL DAILY PRN
Qty: 10 TABLET | Refills: 11 | Status: SHIPPED | OUTPATIENT
Start: 2025-03-10 | End: 2025-07-08

## 2025-03-10 ASSESSMENT — PATIENT HEALTH QUESTIONNAIRE - PHQ9
SUM OF ALL RESPONSES TO PHQ9 QUESTIONS 1 AND 2: 0
1. LITTLE INTEREST OR PLEASURE IN DOING THINGS: NOT AT ALL
2. FEELING DOWN, DEPRESSED OR HOPELESS: NOT AT ALL

## 2025-03-10 NOTE — PROGRESS NOTES
"Subjective   Endy Pruett is a 49 y.o. male  Patient presents for 3-month follow-up.  A1c on 3/3/2025 came down to 6.6%.  Last A1c on 10/24/2024 was extremely elevated at 12.5%.  Congratulated patient on his great work.  He is currently on metformin 1000 mg twice daily, and Mounjaro 7.5 mg weekly.  He sometimes gets diarrhea the day after his injection, and sometimes gets constipation, but he feels like overall the side effects are evening out.  He does get some appetite suppression however he is not really lost weight.  He got a finger infection from his lancet so he has stopped checking blood sugars at home.  He is up-to-date on annual diabetic foot and eye exams.    Recent blood work showed his vitamin D is back in the normal range since he has begun supplementation.    His elevated bilirubin is gone back to normal.  He has decreased his alcohol consumption and believes this is the cause of why his bilirubin was high in the first place.    His blood pressure is a little elevated today.  He has not been checking at home recently.    He has not yet gotten his colonoscopy done or gotten his hearing test done at the audiologist.    He is still suffering from erectile dysfunction.  He has failed Viagra in the past.    Objective   /86   Pulse 74   Temp 35.9 °C (96.6 °F)   Resp 19   Ht 1.778 m (5' 10\")   Wt 112 kg (247 lb)   SpO2 98%   BMI 35.44 kg/m²    PHYSICAL EXAM  Gen: Well appearing, in NAD  Eyes: EOMI  HEENT: MMM  Heart: RRR, no murmurs  Lungs: No increased work of breathing, CTAB, on RA  GI: Soft, NTND, no guarding or rebound  Extremities: WWP, cap refill <2sec, no pitting edema in LE b/l  Neuro: Alert, symmetrical facies, moves all extremities equally  Psych: Appropriate mood and affect    Assessment/Plan     Problem List Items Addressed This Visit       Type 2 diabetes mellitus without complication, without long-term current use of insulin (Multi) - Primary    Current Assessment & Plan     A1c at " goal, 6.6%.  Continue high-protein, low carbohydrate diet.  Continue metformin 1000 mg twice daily and Mounjaro 7.5 mg weekly.  Continue monthly phone calls with clinical pharmacist and uptitrating GLP-1 medication as appropriate.  Up-to-date on annual diabetic foot and eye exam.  Continue statin.  If blood pressure continues to right a little bit high, will start losartan 25 mg daily.  Will get urine microalbumin today.  He may stop checking his sugars at home since his A1c is so well-controlled now and that he was getting a finger infection before from the lancet.  Follow-up in 3 months.  If at that time sugars are getting higher, could consider a CGM.         Relevant Orders    Albumin-Creatinine Ratio, Urine Random    Obesity, Class II, BMI 35-39.9    Overview     Counseled on healthy diet and regular exercise         Erectile dysfunction    Current Assessment & Plan     Will try Cialis 10 mg as needed.  He has failed Viagra in the past         Relevant Medications    tadalafil (Cialis) 10 mg tablet    Bilateral hearing loss    Current Assessment & Plan     Audiology referral provided previously, patient knows he needs to make the appointment still         Vitamin D deficiency    Overview     Continue multivitamin plus vitamin D 2000 international units daily          Mile Hurst D.O.  Family Medicine Physician  [facility]  [address] [place] [address] [phone]    This note has been transcribed using Dragon voice recognition system and there is a possibility of unintentional typing misprints.

## 2025-03-10 NOTE — ASSESSMENT & PLAN NOTE
A1c at goal, 6.6%.  Continue high-protein, low carbohydrate diet.  Continue metformin 1000 mg twice daily and Mounjaro 7.5 mg weekly.  Continue monthly phone calls with clinical pharmacist and uptitrating GLP-1 medication as appropriate.  Up-to-date on annual diabetic foot and eye exam.  Continue statin.  If blood pressure continues to right a little bit high, will start losartan 25 mg daily.  Will get urine microalbumin today.  He may stop checking his sugars at home since his A1c is so well-controlled now and that he was getting a finger infection before from the lancet.  Follow-up in 3 months.  If at that time sugars are getting higher, could consider a CGM.

## 2025-03-13 LAB
ALBUMIN/CREAT UR: 19 MG/G CREAT
CREAT UR-MCNC: 126 MG/DL (ref 20–320)
MICROALBUMIN UR-MCNC: 2.4 MG/DL

## 2025-03-15 ENCOUNTER — PHARMACY VISIT (OUTPATIENT)
Dept: PHARMACY | Facility: CLINIC | Age: 50
End: 2025-03-15
Payer: COMMERCIAL

## 2025-04-01 ENCOUNTER — APPOINTMENT (OUTPATIENT)
Dept: PHARMACY | Facility: HOSPITAL | Age: 50
End: 2025-04-01
Payer: COMMERCIAL

## 2025-04-01 DIAGNOSIS — E11.9 TYPE 2 DIABETES MELLITUS WITHOUT COMPLICATION, WITHOUT LONG-TERM CURRENT USE OF INSULIN: ICD-10-CM

## 2025-04-01 PROCEDURE — RXMED WILLOW AMBULATORY MEDICATION CHARGE

## 2025-04-01 NOTE — ASSESSMENT & PLAN NOTE
Is pt at goal? Yes, 6.6%  Patient's SMBGs are at goal. Diarrhea and constipation have improved. No changes needed today - will allow body to adjust to 7.5mg dose. Can try to increase in future as tolerated for additional weight loss, but not needed from BG standpoint.    Medication Changes:  CONTINUE  Mounjaro to 7.5mg once weekly      PATIENT EDUCATION/GOALS  Average < 150mg/dL  Time in range > 70%  Fasting B - 130 mg/dL  Postprandial BG: less than 180 mg/dL  A1c: less than 7%    Low and High Blood Sugar  Symptoms of low blood sugar include: Fast heartbeat, shaking, sweating, nervousness or anxiety, irritability or confusion, and/or dizziness.  Symptoms of high blood sugar include: Feeling more thirsty than usual, urinating often, losing weight without trying, presence of ketones in the urine, feeling tired and weak, feeling irritable or having other mood changes, having blurry vision, and/or having slow-healing sores.  If you experience symptoms of low blood sugar (blood sugar less than 70 mg/dL) follow the rule of 15 by eating ~15 g of simple carbohydrates (examples: half cup juice, 3-4 glucose tabs, 1 tablespoon of sugar, honey, or syrup).    Dietary Recommendations  The a lower carbohydrate or Mediterranean diet is often recommended for patients with elevated blood glucose.   Food recommendations:   Focus on whole foods, with as few ingredients as possible.   Focus on lower glycemic foods (GI of 55 or less): this includes most fruits and vegetables, beans, minimally processed grains, dairy, nuts and seeds.  Minimize moderate glycemic index (GI 56 to 69) foods: White and sweet potatoes, corn, white rice, couscous, breakfast cereals such as Cream of Wheat.  Avoid high glycemic index (GI of 70 or higher): White bread, rice cakes, most crackers, bagels, cakes, doughnuts, croissants, most packaged breakfast cereals.  Include 1-2 servings weekly fatty fish that are low in mercury such as salmon, mackerel,  anchovies, sardines, and herring. Avoid frying fish. Bake, steam, or poach.   Avoid trans-fats (fried foods, microwave popcorn, margarine, etc.).   Use oils such as coconut oil, olive oil, avocado oil, or ghee. Select oils appropriate for the temperature you are cooking at.   Avoid processed meats (such as deli meat), canned soups, soy sauce, and fried foods these are all high in added sodium.   The recommended sodium intake for most people is around 2,300 mg/day (too little or too much salt can affect blood pressure).   It is ok to add salt to suit your taste to fresh whole foods (such as vegetables) that you are cooking at home.   Include 4-5 servings daily of both fruits and vegetables. Fruits and vegetables are a good source of fiber, potassium, and magnesium which help support healthy blood pressure.  Focus on eating a variety of colors each day (eating the rainbow- such as red peppers, orange carrots, yellow beans, green lettuce, blue/purple berries, white/brown onions).   Avoid foods with added sugars (goal of <10 grams/serving of added sugar). Limit added sugars to less than 24 (women)-36 (men) grams daily.  Beverage recommendations:    Avoid caffeinated drinks such as coffee, energy drinks, and soda (both regular and diet). Consider sparkling water, water with lemon (or other fruit), or black, oolong, or green tea (prior to noon).   Avoid regular consumption of alcohol. If it is a special occasion the recommended alcohol intake for a male is 2 (men) or 1(women) or less drinks per day.  Alcohol consumption may place people with diabetes at increased risk for delayed hypoglycemia (low blood sugar) especially if taking other medications that may cause hypoglycemia such as insulin.     Lifestyle Recommendations  Avoid tobacco products (including chewing tobacco and vaping).   Continue to integrate regular movement and enjoyable forms of exercise into your weekly routine. The recommended exercise regimen is 150  minutes per week (for example 5 days per week, 30 minutes per day).   Consider walking for 10-15 minutes after each meal in order to help control blood sugar.   Manage/reduce stress  Consider therapy, mindfulness, breathing exercises (4-7-8 breath), meditation, yoga, journaling, addressing/removing stressors, etc.   Sleep  Goal of 7-9 hours of restful sleep nightly.      Mounjaro Education:  Counseled patient on Mounjaro MOA, expectations, side effects, duration of therapy, administration, and monitoring parameters.  Counseled patient on the benefits of GLP-1ra, such as cardiovascular risk reduction, glycemic control, and weight loss potential.  Provided detailed dosing and administration counseling to ensure proper technique.   Reviewed Mounjaro titration schedule, starting with 2.5 mg once weekly to 5 mg, 7.5mg, 10mg, 12.5mg and if tolerated 15 mg.  Reviewed storage requirements of Mounjaro when not in use, and when to administer the medication if a dose is missed.  Discussed risks of GLP1ra including risk of pancreatitis, MTC and worsening of DR  Advised patient that they may experience improved satiety after meals and portion sizes of meals may be reduced as doses of Mounjaro increase.

## 2025-04-02 ENCOUNTER — PHARMACY VISIT (OUTPATIENT)
Dept: PHARMACY | Facility: CLINIC | Age: 50
End: 2025-04-02
Payer: MEDICARE

## 2025-04-03 ENCOUNTER — PHARMACY VISIT (OUTPATIENT)
Dept: PHARMACY | Facility: CLINIC | Age: 50
End: 2025-04-03
Payer: MEDICARE

## 2025-04-27 DIAGNOSIS — E11.9 TYPE 2 DIABETES MELLITUS WITHOUT COMPLICATION, WITHOUT LONG-TERM CURRENT USE OF INSULIN: ICD-10-CM

## 2025-04-28 PROCEDURE — RXMED WILLOW AMBULATORY MEDICATION CHARGE

## 2025-04-28 RX ORDER — TIRZEPATIDE 7.5 MG/.5ML
7.5 INJECTION, SOLUTION SUBCUTANEOUS WEEKLY
Qty: 2 ML | Refills: 1 | Status: SHIPPED | OUTPATIENT
Start: 2025-04-28

## 2025-04-29 ENCOUNTER — PHARMACY VISIT (OUTPATIENT)
Dept: PHARMACY | Facility: CLINIC | Age: 50
End: 2025-04-29
Payer: MEDICARE

## 2025-05-12 ENCOUNTER — APPOINTMENT (OUTPATIENT)
Dept: PHARMACY | Facility: HOSPITAL | Age: 50
End: 2025-05-12
Payer: COMMERCIAL

## 2025-05-19 PROCEDURE — RXMED WILLOW AMBULATORY MEDICATION CHARGE

## 2025-05-20 ENCOUNTER — PHARMACY VISIT (OUTPATIENT)
Dept: PHARMACY | Facility: CLINIC | Age: 50
End: 2025-05-20
Payer: MEDICARE

## 2025-05-20 PROCEDURE — RXMED WILLOW AMBULATORY MEDICATION CHARGE

## 2025-05-22 ENCOUNTER — PHARMACY VISIT (OUTPATIENT)
Dept: PHARMACY | Facility: CLINIC | Age: 50
End: 2025-05-22
Payer: MEDICARE

## 2025-06-03 ENCOUNTER — APPOINTMENT (OUTPATIENT)
Dept: PHARMACY | Facility: HOSPITAL | Age: 50
End: 2025-06-03
Payer: COMMERCIAL

## 2025-06-03 DIAGNOSIS — E11.9 TYPE 2 DIABETES MELLITUS WITHOUT COMPLICATION, WITHOUT LONG-TERM CURRENT USE OF INSULIN: ICD-10-CM

## 2025-06-03 RX ORDER — TIRZEPATIDE 7.5 MG/.5ML
7.5 INJECTION, SOLUTION SUBCUTANEOUS WEEKLY
Qty: 2 ML | Refills: 2 | Status: SHIPPED | OUTPATIENT
Start: 2025-06-03

## 2025-06-03 NOTE — PROGRESS NOTES
"      Patient ID: Endy Pruett is a 49 y.o. male who presents for Diabetes.  Pt is here for Follow Up appointment.     PCP/Referring Provider: Mile Hurst DO  Last Visit: 3.10.25    Verbal consent to manage patient's drug therapy was obtained from patient. They were informed they may decline to participate or withdraw from participation in pharmacy services at any time.     Patient Assistance for Mounjaro approved through 12.3.2025. Will have to be renewed prior to that date to prevent lapse in coverage. Medication(s) will be received at no cost to patient from [facility].      Subjective   Treatment Adherence:   Preferred pharmacy: [facility]  Can patient afford prescribed medications: No, Mounjaro $$$    Starting weight: 247lb  Last weight: 240-245lb (fluctuates greatly)  Current weight: 226lb      HPI  DIABETES MELLITUS TYPE 2:    Known diabetic complications: none.  Does patient follow with Endocrinology?: no     Last optometry exam: not assessed  Most recent visit in Podiatry: not assessed     Current diabetic medications include:  Mounjaro 7.5mg once weekly - Thur evening  Metformin 1G twice daily     Clarifications to above regimen: none  Adverse Effects: no diarrhea or constipation - states things have \"normalized\"    Home blood glucose records (mg/dL)  Max 130    Any episodes of hypoglycemia? No, denies.    Did patient treat episode of hypoglycemia appropriately? N/A  Does the patient have a prescription for ready-to-use Glucagon? Not on insulin     Does pt have proteinuria? N/A   If appropriate, is patient on ACEi/ARB? N/A    Secondary Prevention  Statin? Yes  ACE-I/ARB? No  Aspirin? N/A    Pertinent PMH Review:  PMH of Pancreatitis: No  PMH of Retinopathy: No  PMH of Urinary Tract Infections: No  PMH of MTC: No    Lifestyle:  Current exercise: is a , on his feet  Current diet: feels appetite is suppressed; must have smaller meals  Trying to prioritize proteins  Granola bars in the " AM (NV oats and honey, peanut butter), no protein shakes yet        Review of Systems    Objective     BP Readings from Last 4 Encounters:   03/10/25 140/86   11/25/24 128/60   10/24/24 138/64      There were no vitals filed for this visit.     Labs  Creatinine   Date Value Ref Range Status   11/12/2024 0.82 0.50 - 1.30 mg/dL Final     eGFR   Date Value Ref Range Status   11/12/2024 >90 >60 mL/min/1.73m*2 Final     Comment:     Calculations of estimated GFR are performed using the 2021 CKD-EPI Study Refit equation without the race variable for the IDMS-Traceable creatinine methods.  https://jasn.asnjournals.org/content/early/2021/09/22/ASN.7865676448     Sodium   Date Value Ref Range Status   11/12/2024 139 136 - 145 mmol/L Final     Potassium   Date Value Ref Range Status   11/12/2024 4.4 3.5 - 5.3 mmol/L Final     Chloride   Date Value Ref Range Status   11/12/2024 103 98 - 107 mmol/L Final     Urea Nitrogen   Date Value Ref Range Status   11/12/2024 9 6 - 23 mg/dL Final     AST   Date Value Ref Range Status   03/03/2025 24 10 - 40 U/L Final        Lab Results   Component Value Date    BILITOT 1.1 03/03/2025    CALCIUM 8.6 11/12/2024    CO2 32 11/12/2024     11/12/2024    CREATININE 0.82 11/12/2024    GLUCOSE 177 (H) 11/12/2024    ALKPHOS 42 03/03/2025    K 4.4 11/12/2024    PROT 6.8 03/03/2025     11/12/2024    AST 24 03/03/2025    ALT 25 03/03/2025    BUN 9 11/12/2024    ANIONGAP 8 (L) 11/12/2024    ALBUMIN 4.5 03/03/2025     Lab Results   Component Value Date    TRIG 87 11/12/2024    CHOL 117 11/12/2024    LDLCALC 50 11/12/2024    HDL 49.4 11/12/2024     Lab Results   Component Value Date    HGBA1C 6.6 (H) 03/03/2025       Current Outpatient Medications   Medication Instructions    blood sugar diagnostic strip Test glucose 1 to 3 times a day as needed (for low blood sugar symptoms)    cholecalciferol (VITAMIN D3) 50 mcg, oral, Daily    lancets 30 gauge misc Test glucose 1 to 3 times a day as needed  (for low blood sugar symptoms)    metFORMIN (GLUCOPHAGE) 1,000 mg, oral, 2 times daily (morning and late afternoon), USE AS DIRECTED    Mounjaro 7.5 mg, subcutaneous, Weekly    multivitamin tablet 1 tablet, Daily    rosuvastatin (CRESTOR) 20 mg, oral, Daily    tadalafil (CIALIS) 10 mg, oral, Daily PRN        DRUG INTERACTIONS:  None at time of review    Assessment/Plan   Problem List Items Addressed This Visit           ICD-10-CM    Type 2 diabetes mellitus without complication, without long-term current use of insulin E11.9    Is pt at goal? Yes, 6.6%  Patient's SMBGs are at goal. Diarrhea and constipation have improved. No changes needed today as he has started to lose more weight. Can try to increase in future as tolerated for additional weight loss, but not needed from BG standpoint.    Medication Changes:  CONTINUE  Mounjaro 7.5mg once weekly  Metformin 1G twice daily       PATIENT EDUCATION/GOALS  Average < 150mg/dL  Time in range > 70%  Fasting B - 130 mg/dL  Postprandial BG: less than 180 mg/dL  A1c: less than 7%    Low and High Blood Sugar  Symptoms of low blood sugar include: Fast heartbeat, shaking, sweating, nervousness or anxiety, irritability or confusion, and/or dizziness.  Symptoms of high blood sugar include: Feeling more thirsty than usual, urinating often, losing weight without trying, presence of ketones in the urine, feeling tired and weak, feeling irritable or having other mood changes, having blurry vision, and/or having slow-healing sores.  If you experience symptoms of low blood sugar (blood sugar less than 70 mg/dL) follow the rule of 15 by eating ~15 g of simple carbohydrates (examples: half cup juice, 3-4 glucose tabs, 1 tablespoon of sugar, honey, or syrup).    Dietary Recommendations  The a lower carbohydrate or Mediterranean diet is often recommended for patients with elevated blood glucose.   Food recommendations:   Focus on whole foods, with as few ingredients as possible.    Focus on lower glycemic foods (GI of 55 or less): this includes most fruits and vegetables, beans, minimally processed grains, dairy, nuts and seeds.  Minimize moderate glycemic index (GI 56 to 69) foods: White and sweet potatoes, corn, white rice, couscous, breakfast cereals such as Cream of Wheat.  Avoid high glycemic index (GI of 70 or higher): White bread, rice cakes, most crackers, bagels, cakes, doughnuts, croissants, most packaged breakfast cereals.  Include 1-2 servings weekly fatty fish that are low in mercury such as salmon, mackerel, anchovies, sardines, and herring. Avoid frying fish. Bake, steam, or poach.   Avoid trans-fats (fried foods, microwave popcorn, margarine, etc.).   Use oils such as coconut oil, olive oil, avocado oil, or ghee. Select oils appropriate for the temperature you are cooking at.   Avoid processed meats (such as deli meat), canned soups, soy sauce, and fried foods these are all high in added sodium.   The recommended sodium intake for most people is around 2,300 mg/day (too little or too much salt can affect blood pressure).   It is ok to add salt to suit your taste to fresh whole foods (such as vegetables) that you are cooking at home.   Include 4-5 servings daily of both fruits and vegetables. Fruits and vegetables are a good source of fiber, potassium, and magnesium which help support healthy blood pressure.  Focus on eating a variety of colors each day (eating the rainbow- such as red peppers, orange carrots, yellow beans, green lettuce, blue/purple berries, white/brown onions).   Avoid foods with added sugars (goal of <10 grams/serving of added sugar). Limit added sugars to less than 24 (women)-36 (men) grams daily.  Beverage recommendations:    Avoid caffeinated drinks such as coffee, energy drinks, and soda (both regular and diet). Consider sparkling water, water with lemon (or other fruit), or black, oolong, or green tea (prior to noon).   Avoid regular consumption of  alcohol. If it is a special occasion the recommended alcohol intake for a male is 2 (men) or 1(women) or less drinks per day.  Alcohol consumption may place people with diabetes at increased risk for delayed hypoglycemia (low blood sugar) especially if taking other medications that may cause hypoglycemia such as insulin.     Lifestyle Recommendations  Avoid tobacco products (including chewing tobacco and vaping).   Continue to integrate regular movement and enjoyable forms of exercise into your weekly routine. The recommended exercise regimen is 150 minutes per week (for example 5 days per week, 30 minutes per day).   Consider walking for 10-15 minutes after each meal in order to help control blood sugar.   Manage/reduce stress  Consider therapy, mindfulness, breathing exercises (4-7-8 breath), meditation, yoga, journaling, addressing/removing stressors, etc.   Sleep  Goal of 7-9 hours of restful sleep nightly.      Mounjaro Education:  Counseled patient on Mounjaro MOA, expectations, side effects, duration of therapy, administration, and monitoring parameters.  Counseled patient on the benefits of GLP-1ra, such as cardiovascular risk reduction, glycemic control, and weight loss potential.  Provided detailed dosing and administration counseling to ensure proper technique.   Reviewed Mounjaro titration schedule, starting with 2.5 mg once weekly to 5 mg, 7.5mg, 10mg, 12.5mg and if tolerated 15 mg.  Reviewed storage requirements of Mounjaro when not in use, and when to administer the medication if a dose is missed.  Discussed risks of GLP1ra including risk of pancreatitis, MTC and worsening of DR  Advised patient that they may experience improved satiety after meals and portion sizes of meals may be reduced as doses of Mounjaro increase.         Relevant Medications    tirzepatide (Mounjaro) 7.5 mg/0.5 mL pen injector    Other Relevant Orders    Referral to Clinical Pharmacy       Health Maintenance Due   Topic Date  Due    HIV Screening  Never done    Colorectal Cancer Screening  Never done    MMR Vaccines (1 of 1 - Standard series) Never done    Diabetes: Retinopathy Screening  Never done    Hepatitis C Screening  Never done    Hepatitis B Vaccines (1 of 3 - 19+ 3-dose series) Never done    COVID-19 Vaccine (1 - 2024-25 season) Never done    Diabetes: Hemoglobin A1C  06/03/2025     Labs ordered:  none  Referrals:  none  Follow-up: 8 weeks     Patient was provided with PharmD phone number and encouraged to reach out with any questions or concerns Prior to next appointment or ask provider for another pharmacy referral.    Time spent with pt: Total length of time 10 (minutes) of the encounter and more than 50% was spent counseling the patient.    Thank you for allowing to take part in the care of this patient.    Diane Escalona, PharmD, BARRIE  Clinical Pharmacist  [phone]  [email or web address]    Continue all meds under the continuation of care with the referring provider and clinical pharmacy team.    Verbal consent to manage patient's drug therapy was obtained from the patient. They were informed they may decline to participate or withdraw from participation in pharmacy services at any time.

## 2025-06-03 NOTE — ASSESSMENT & PLAN NOTE
Is pt at goal? Yes, 6.6%  Patient's SMBGs are at goal. Diarrhea and constipation have improved. No changes needed today as he has started to lose more weight. Can try to increase in future as tolerated for additional weight loss, but not needed from BG standpoint.    Medication Changes:  CONTINUE  Mounjaro 7.5mg once weekly  Metformin 1G twice daily       PATIENT EDUCATION/GOALS  Average < 150mg/dL  Time in range > 70%  Fasting B - 130 mg/dL  Postprandial BG: less than 180 mg/dL  A1c: less than 7%    Low and High Blood Sugar  Symptoms of low blood sugar include: Fast heartbeat, shaking, sweating, nervousness or anxiety, irritability or confusion, and/or dizziness.  Symptoms of high blood sugar include: Feeling more thirsty than usual, urinating often, losing weight without trying, presence of ketones in the urine, feeling tired and weak, feeling irritable or having other mood changes, having blurry vision, and/or having slow-healing sores.  If you experience symptoms of low blood sugar (blood sugar less than 70 mg/dL) follow the rule of 15 by eating ~15 g of simple carbohydrates (examples: half cup juice, 3-4 glucose tabs, 1 tablespoon of sugar, honey, or syrup).    Dietary Recommendations  The a lower carbohydrate or Mediterranean diet is often recommended for patients with elevated blood glucose.   Food recommendations:   Focus on whole foods, with as few ingredients as possible.   Focus on lower glycemic foods (GI of 55 or less): this includes most fruits and vegetables, beans, minimally processed grains, dairy, nuts and seeds.  Minimize moderate glycemic index (GI 56 to 69) foods: White and sweet potatoes, corn, white rice, couscous, breakfast cereals such as Cream of Wheat.  Avoid high glycemic index (GI of 70 or higher): White bread, rice cakes, most crackers, bagels, cakes, doughnuts, croissants, most packaged breakfast cereals.  Include 1-2 servings weekly fatty fish that are low in mercury such as  salmon, mackerel, anchovies, sardines, and herring. Avoid frying fish. Bake, steam, or poach.   Avoid trans-fats (fried foods, microwave popcorn, margarine, etc.).   Use oils such as coconut oil, olive oil, avocado oil, or ghee. Select oils appropriate for the temperature you are cooking at.   Avoid processed meats (such as deli meat), canned soups, soy sauce, and fried foods these are all high in added sodium.   The recommended sodium intake for most people is around 2,300 mg/day (too little or too much salt can affect blood pressure).   It is ok to add salt to suit your taste to fresh whole foods (such as vegetables) that you are cooking at home.   Include 4-5 servings daily of both fruits and vegetables. Fruits and vegetables are a good source of fiber, potassium, and magnesium which help support healthy blood pressure.  Focus on eating a variety of colors each day (eating the rainbow- such as red peppers, orange carrots, yellow beans, green lettuce, blue/purple berries, white/brown onions).   Avoid foods with added sugars (goal of <10 grams/serving of added sugar). Limit added sugars to less than 24 (women)-36 (men) grams daily.  Beverage recommendations:    Avoid caffeinated drinks such as coffee, energy drinks, and soda (both regular and diet). Consider sparkling water, water with lemon (or other fruit), or black, oolong, or green tea (prior to noon).   Avoid regular consumption of alcohol. If it is a special occasion the recommended alcohol intake for a male is 2 (men) or 1(women) or less drinks per day.  Alcohol consumption may place people with diabetes at increased risk for delayed hypoglycemia (low blood sugar) especially if taking other medications that may cause hypoglycemia such as insulin.     Lifestyle Recommendations  Avoid tobacco products (including chewing tobacco and vaping).   Continue to integrate regular movement and enjoyable forms of exercise into your weekly routine. The recommended  exercise regimen is 150 minutes per week (for example 5 days per week, 30 minutes per day).   Consider walking for 10-15 minutes after each meal in order to help control blood sugar.   Manage/reduce stress  Consider therapy, mindfulness, breathing exercises (4-7-8 breath), meditation, yoga, journaling, addressing/removing stressors, etc.   Sleep  Goal of 7-9 hours of restful sleep nightly.      Mounjaro Education:  Counseled patient on Mounjaro MOA, expectations, side effects, duration of therapy, administration, and monitoring parameters.  Counseled patient on the benefits of GLP-1ra, such as cardiovascular risk reduction, glycemic control, and weight loss potential.  Provided detailed dosing and administration counseling to ensure proper technique.   Reviewed Mounjaro titration schedule, starting with 2.5 mg once weekly to 5 mg, 7.5mg, 10mg, 12.5mg and if tolerated 15 mg.  Reviewed storage requirements of Mounjaro when not in use, and when to administer the medication if a dose is missed.  Discussed risks of GLP1ra including risk of pancreatitis, MTC and worsening of DR  Advised patient that they may experience improved satiety after meals and portion sizes of meals may be reduced as doses of Mounjaro increase.

## 2025-06-10 ENCOUNTER — APPOINTMENT (OUTPATIENT)
Dept: PRIMARY CARE | Facility: CLINIC | Age: 50
End: 2025-06-10
Payer: COMMERCIAL

## 2025-06-23 PROCEDURE — RXMED WILLOW AMBULATORY MEDICATION CHARGE

## 2025-06-25 ENCOUNTER — PHARMACY VISIT (OUTPATIENT)
Dept: PHARMACY | Facility: CLINIC | Age: 50
End: 2025-06-25
Payer: MEDICARE

## 2025-07-01 ENCOUNTER — APPOINTMENT (OUTPATIENT)
Dept: PRIMARY CARE | Facility: CLINIC | Age: 50
End: 2025-07-01
Payer: COMMERCIAL

## 2025-07-14 ENCOUNTER — APPOINTMENT (OUTPATIENT)
Dept: PRIMARY CARE | Facility: CLINIC | Age: 50
End: 2025-07-14
Payer: COMMERCIAL

## 2025-07-18 PROCEDURE — RXMED WILLOW AMBULATORY MEDICATION CHARGE

## 2025-07-22 ENCOUNTER — PHARMACY VISIT (OUTPATIENT)
Dept: PHARMACY | Facility: CLINIC | Age: 50
End: 2025-07-22
Payer: MEDICARE

## 2025-08-04 ENCOUNTER — APPOINTMENT (OUTPATIENT)
Dept: PHARMACY | Facility: HOSPITAL | Age: 50
End: 2025-08-04
Payer: COMMERCIAL

## 2025-08-05 ENCOUNTER — APPOINTMENT (OUTPATIENT)
Dept: PRIMARY CARE | Facility: CLINIC | Age: 50
End: 2025-08-05
Payer: COMMERCIAL

## 2025-08-13 PROCEDURE — RXMED WILLOW AMBULATORY MEDICATION CHARGE

## 2025-08-14 ENCOUNTER — PHARMACY VISIT (OUTPATIENT)
Dept: PHARMACY | Facility: CLINIC | Age: 50
End: 2025-08-14
Payer: MEDICARE

## 2025-08-14 PROCEDURE — RXMED WILLOW AMBULATORY MEDICATION CHARGE

## 2025-08-15 ENCOUNTER — PHARMACY VISIT (OUTPATIENT)
Dept: PHARMACY | Facility: CLINIC | Age: 50
End: 2025-08-15
Payer: COMMERCIAL

## 2025-08-26 ENCOUNTER — APPOINTMENT (OUTPATIENT)
Dept: PRIMARY CARE | Facility: CLINIC | Age: 50
End: 2025-08-26
Payer: COMMERCIAL

## 2025-09-09 ENCOUNTER — APPOINTMENT (OUTPATIENT)
Dept: PRIMARY CARE | Facility: CLINIC | Age: 50
End: 2025-09-09
Payer: COMMERCIAL

## 2025-09-30 ENCOUNTER — APPOINTMENT (OUTPATIENT)
Dept: PRIMARY CARE | Facility: CLINIC | Age: 50
End: 2025-09-30
Payer: COMMERCIAL

## 2025-11-25 ENCOUNTER — APPOINTMENT (OUTPATIENT)
Dept: PRIMARY CARE | Facility: CLINIC | Age: 50
End: 2025-11-25
Payer: COMMERCIAL